# Patient Record
Sex: FEMALE | Race: WHITE
[De-identification: names, ages, dates, MRNs, and addresses within clinical notes are randomized per-mention and may not be internally consistent; named-entity substitution may affect disease eponyms.]

---

## 2023-04-07 VITALS
DIASTOLIC BLOOD PRESSURE: 89 MMHG | OXYGEN SATURATION: 100 % | RESPIRATION RATE: 16 BRPM | HEART RATE: 73 BPM | SYSTOLIC BLOOD PRESSURE: 136 MMHG | TEMPERATURE: 97.88 F

## 2023-04-13 ENCOUNTER — HOSPITAL ENCOUNTER (OUTPATIENT)
Dept: HOSPITAL 100 - SDC | Age: 53
Setting detail: OBSERVATION
LOS: 1 days | Discharge: HOME HEALTH | End: 2023-04-14
Payer: COMMERCIAL

## 2023-04-13 VITALS
DIASTOLIC BLOOD PRESSURE: 95 MMHG | RESPIRATION RATE: 16 BRPM | TEMPERATURE: 99.1 F | HEART RATE: 59 BPM | SYSTOLIC BLOOD PRESSURE: 148 MMHG | OXYGEN SATURATION: 98 %

## 2023-04-13 VITALS
HEART RATE: 68 BPM | OXYGEN SATURATION: 100 % | DIASTOLIC BLOOD PRESSURE: 95 MMHG | SYSTOLIC BLOOD PRESSURE: 148 MMHG | RESPIRATION RATE: 16 BRPM

## 2023-04-13 VITALS
OXYGEN SATURATION: 98 % | RESPIRATION RATE: 16 BRPM | HEART RATE: 60 BPM | DIASTOLIC BLOOD PRESSURE: 62 MMHG | SYSTOLIC BLOOD PRESSURE: 148 MMHG

## 2023-04-13 VITALS
RESPIRATION RATE: 16 BRPM | OXYGEN SATURATION: 100 % | DIASTOLIC BLOOD PRESSURE: 95 MMHG | SYSTOLIC BLOOD PRESSURE: 115 MMHG | HEART RATE: 73 BPM

## 2023-04-13 VITALS
DIASTOLIC BLOOD PRESSURE: 67 MMHG | RESPIRATION RATE: 16 BRPM | SYSTOLIC BLOOD PRESSURE: 115 MMHG | HEART RATE: 60 BPM | OXYGEN SATURATION: 100 %

## 2023-04-13 VITALS
RESPIRATION RATE: 16 BRPM | TEMPERATURE: 98.06 F | OXYGEN SATURATION: 96 % | DIASTOLIC BLOOD PRESSURE: 82 MMHG | HEART RATE: 62 BPM | SYSTOLIC BLOOD PRESSURE: 130 MMHG

## 2023-04-13 VITALS
SYSTOLIC BLOOD PRESSURE: 119 MMHG | DIASTOLIC BLOOD PRESSURE: 95 MMHG | RESPIRATION RATE: 16 BRPM | HEART RATE: 85 BPM | OXYGEN SATURATION: 96 %

## 2023-04-13 VITALS
RESPIRATION RATE: 18 BRPM | TEMPERATURE: 98.42 F | OXYGEN SATURATION: 96 % | SYSTOLIC BLOOD PRESSURE: 129 MMHG | HEART RATE: 74 BPM | DIASTOLIC BLOOD PRESSURE: 74 MMHG

## 2023-04-13 VITALS
TEMPERATURE: 98.96 F | OXYGEN SATURATION: 100 % | SYSTOLIC BLOOD PRESSURE: 148 MMHG | HEART RATE: 80 BPM | RESPIRATION RATE: 20 BRPM | DIASTOLIC BLOOD PRESSURE: 95 MMHG

## 2023-04-13 VITALS
DIASTOLIC BLOOD PRESSURE: 95 MMHG | HEART RATE: 96 BPM | TEMPERATURE: 98.78 F | SYSTOLIC BLOOD PRESSURE: 148 MMHG | OXYGEN SATURATION: 96 % | RESPIRATION RATE: 16 BRPM

## 2023-04-13 VITALS
HEART RATE: 64 BPM | DIASTOLIC BLOOD PRESSURE: 78 MMHG | RESPIRATION RATE: 18 BRPM | TEMPERATURE: 98.1 F | SYSTOLIC BLOOD PRESSURE: 122 MMHG | OXYGEN SATURATION: 96 %

## 2023-04-13 VITALS — BODY MASS INDEX: 21.3 KG/M2 | BODY MASS INDEX: 20.9 KG/M2

## 2023-04-13 DIAGNOSIS — L73.2: Primary | ICD-10-CM

## 2023-04-13 DIAGNOSIS — L30.9: ICD-10-CM

## 2023-04-13 PROCEDURE — 00400 ANES INTEGUMENTARY SYS NOS: CPT

## 2023-04-13 PROCEDURE — 96372 THER/PROPH/DIAG INJ SC/IM: CPT

## 2023-04-13 PROCEDURE — G0378 HOSPITAL OBSERVATION PER HR: HCPCS

## 2023-04-13 PROCEDURE — 87070 CULTURE OTHR SPECIMN AEROBIC: CPT

## 2023-04-13 PROCEDURE — 87102 FUNGUS ISOLATION CULTURE: CPT

## 2023-04-13 PROCEDURE — 94668 MNPJ CHEST WALL SBSQ: CPT

## 2023-04-13 PROCEDURE — 87186 SC STD MICRODIL/AGAR DIL: CPT

## 2023-04-13 PROCEDURE — 87206 SMEAR FLUORESCENT/ACID STAI: CPT

## 2023-04-13 PROCEDURE — 87176 TISSUE HOMOGENIZATION CULTR: CPT

## 2023-04-13 PROCEDURE — 80048 BASIC METABOLIC PNL TOTAL CA: CPT

## 2023-04-13 PROCEDURE — 87077 CULTURE AEROBIC IDENTIFY: CPT

## 2023-04-13 PROCEDURE — 87075 CULTR BACTERIA EXCEPT BLOOD: CPT

## 2023-04-13 PROCEDURE — 36415 COLL VENOUS BLD VENIPUNCTURE: CPT

## 2023-04-13 PROCEDURE — 96366 THER/PROPH/DIAG IV INF ADDON: CPT

## 2023-04-13 PROCEDURE — 96375 TX/PRO/DX INJ NEW DRUG ADDON: CPT

## 2023-04-13 PROCEDURE — 88304 TISSUE EXAM BY PATHOLOGIST: CPT

## 2023-04-13 PROCEDURE — 11451 EXC SKN HDRDNT AX COMPLEX: CPT

## 2023-04-13 PROCEDURE — 96365 THER/PROPH/DIAG IV INF INIT: CPT

## 2023-04-13 PROCEDURE — 84134 ASSAY OF PREALBUMIN: CPT

## 2023-04-13 PROCEDURE — 97165 OT EVAL LOW COMPLEX 30 MIN: CPT

## 2023-04-13 PROCEDURE — 85027 COMPLETE CBC AUTOMATED: CPT

## 2023-04-13 PROCEDURE — 99221 1ST HOSP IP/OBS SF/LOW 40: CPT

## 2023-04-13 PROCEDURE — 87205 SMEAR GRAM STAIN: CPT

## 2023-04-13 RX ADMIN — LIDOCAINE HYDROCHLORIDE AND EPINEPHRINE 20 ML: 10; 10 INJECTION, SOLUTION INFILTRATION; PERINEURAL at 13:23

## 2023-04-13 RX ADMIN — CEFAZOLIN 150 GM: 10 INJECTION, POWDER, FOR SOLUTION INTRAVENOUS at 12:48

## 2023-04-13 RX ADMIN — CEFAZOLIN SODIUM 100 GM: 1 INJECTION, SOLUTION INTRAVENOUS at 22:01

## 2023-04-14 VITALS
HEART RATE: 67 BPM | SYSTOLIC BLOOD PRESSURE: 125 MMHG | RESPIRATION RATE: 18 BRPM | TEMPERATURE: 98.4 F | OXYGEN SATURATION: 98 % | DIASTOLIC BLOOD PRESSURE: 70 MMHG

## 2023-04-14 VITALS
HEART RATE: 74 BPM | SYSTOLIC BLOOD PRESSURE: 121 MMHG | TEMPERATURE: 97.88 F | RESPIRATION RATE: 16 BRPM | DIASTOLIC BLOOD PRESSURE: 73 MMHG

## 2023-04-14 VITALS
SYSTOLIC BLOOD PRESSURE: 124 MMHG | OXYGEN SATURATION: 98 % | HEART RATE: 75 BPM | TEMPERATURE: 97.8 F | DIASTOLIC BLOOD PRESSURE: 85 MMHG | RESPIRATION RATE: 18 BRPM

## 2023-04-14 LAB
ANION GAP: 7 (ref 5–15)
BUN SERPL-MCNC: 9 MG/DL (ref 7–18)
BUN/CREAT RATIO: 12.9 RATIO (ref 10–20)
CALCIUM SERPL-MCNC: 9 MG/DL (ref 8.5–10.1)
CARBON DIOXIDE: 23 MMOL/L (ref 21–32)
CHLORIDE: 106 MMOL/L (ref 98–107)
DEPRECATED RDW RBC: 40 FL (ref 35.1–43.9)
ERYTHROCYTE [DISTWIDTH] IN BLOOD: 13.6 % (ref 11.6–14.6)
EST GLOM FILT RATE - AFR AMER: 114 ML/MIN (ref 60–?)
ESTIMATED CREATININE CLEARANCE: 85.56 ML/MIN
GLUCOSE: 134 MG/DL (ref 74–106)
HCT VFR BLD AUTO: 36.2 % (ref 37–47)
HEMOGLOBIN: 11.5 G/DL (ref 12–15)
HGB BLD-MCNC: 11.5 G/DL (ref 12–15)
MCV RBC: 81.2 FL (ref 81–99)
MEAN CORP HGB CONC: 31.8 G/DL (ref 32–36)
MEAN PLATELET VOL.: 9.8 FL (ref 6.2–12)
PLATELET # BLD: 265 K/MM3 (ref 150–450)
PLATELET COUNT: 265 K/MM3 (ref 150–450)
POTASSIUM: 3.9 MMOL/L (ref 3.5–5.1)
PREALB SERPL-MCNC: 26.8 MG/DL (ref 20–40)
RBC # BLD AUTO: 4.46 M/MM3 (ref 4.2–5.4)
RBC DISTRIBUTION WIDTH CV: 13.6 % (ref 11.6–14.6)
RBC DISTRIBUTION WIDTH SD: 40 FL (ref 35.1–43.9)
WBC # BLD AUTO: 7.7 K/MM3 (ref 4.4–11)
WHITE BLOOD COUNT: 7.7 K/MM3 (ref 4.4–11)

## 2023-04-14 RX ADMIN — CEFAZOLIN SODIUM 100 GM: 1 INJECTION, SOLUTION INTRAVENOUS at 13:59

## 2023-04-14 RX ADMIN — CEFAZOLIN SODIUM 100 GM: 1 INJECTION, SOLUTION INTRAVENOUS at 05:35

## 2023-04-14 RX ADMIN — Medication 1 PACKET: at 08:03

## 2023-04-19 VITALS
RESPIRATION RATE: 16 BRPM | SYSTOLIC BLOOD PRESSURE: 132 MMHG | TEMPERATURE: 97.2 F | HEART RATE: 89 BPM | DIASTOLIC BLOOD PRESSURE: 82 MMHG

## 2023-04-26 ENCOUNTER — HOSPITAL ENCOUNTER (OUTPATIENT)
Dept: HOSPITAL 100 - WC | Age: 53
LOS: 4 days | Discharge: HOME | End: 2023-04-30
Payer: COMMERCIAL

## 2023-04-26 VITALS — BODY MASS INDEX: 21.1 KG/M2

## 2023-04-26 VITALS
DIASTOLIC BLOOD PRESSURE: 89 MMHG | TEMPERATURE: 96.98 F | RESPIRATION RATE: 20 BRPM | HEART RATE: 71 BPM | SYSTOLIC BLOOD PRESSURE: 135 MMHG

## 2023-04-26 DIAGNOSIS — F41.9: ICD-10-CM

## 2023-04-26 DIAGNOSIS — L30.9: ICD-10-CM

## 2023-04-26 DIAGNOSIS — L73.2: ICD-10-CM

## 2023-04-26 DIAGNOSIS — G89.18: ICD-10-CM

## 2023-04-26 DIAGNOSIS — S41.101A: Primary | ICD-10-CM

## 2023-04-26 DIAGNOSIS — Z79.899: ICD-10-CM

## 2023-04-26 PROCEDURE — 99214 OFFICE O/P EST MOD 30 MIN: CPT

## 2023-04-26 PROCEDURE — 99213 OFFICE O/P EST LOW 20 MIN: CPT

## 2023-04-26 PROCEDURE — 97606 NEG PRS WND THER DME>50 SQCM: CPT

## 2023-04-26 PROCEDURE — G0463 HOSPITAL OUTPT CLINIC VISIT: HCPCS

## 2023-05-01 VITALS
TEMPERATURE: 97 F | RESPIRATION RATE: 20 BRPM | SYSTOLIC BLOOD PRESSURE: 135 MMHG | HEART RATE: 71 BPM | DIASTOLIC BLOOD PRESSURE: 89 MMHG

## 2023-05-03 VITALS
SYSTOLIC BLOOD PRESSURE: 139 MMHG | TEMPERATURE: 99 F | HEART RATE: 76 BPM | RESPIRATION RATE: 20 BRPM | DIASTOLIC BLOOD PRESSURE: 82 MMHG

## 2023-05-10 VITALS
RESPIRATION RATE: 20 BRPM | HEART RATE: 72 BPM | TEMPERATURE: 97.5 F | SYSTOLIC BLOOD PRESSURE: 123 MMHG | DIASTOLIC BLOOD PRESSURE: 76 MMHG

## 2023-05-17 VITALS — HEART RATE: 76 BPM | DIASTOLIC BLOOD PRESSURE: 69 MMHG | SYSTOLIC BLOOD PRESSURE: 139 MMHG | TEMPERATURE: 96.4 F

## 2023-05-24 VITALS — HEART RATE: 44 BPM | TEMPERATURE: 97.1 F | DIASTOLIC BLOOD PRESSURE: 65 MMHG | SYSTOLIC BLOOD PRESSURE: 117 MMHG

## 2023-05-31 ENCOUNTER — HOSPITAL ENCOUNTER (OUTPATIENT)
Dept: HOSPITAL 100 - WC | Age: 53
Discharge: HOME | End: 2023-05-31
Payer: COMMERCIAL

## 2023-05-31 VITALS — HEART RATE: 78 BPM | SYSTOLIC BLOOD PRESSURE: 135 MMHG | TEMPERATURE: 98.3 F | DIASTOLIC BLOOD PRESSURE: 75 MMHG

## 2023-05-31 DIAGNOSIS — L30.9: ICD-10-CM

## 2023-05-31 DIAGNOSIS — L73.2: ICD-10-CM

## 2023-05-31 DIAGNOSIS — S41.101A: Primary | ICD-10-CM

## 2023-05-31 DIAGNOSIS — Z79.899: ICD-10-CM

## 2023-05-31 DIAGNOSIS — G89.18: ICD-10-CM

## 2023-05-31 PROCEDURE — 11046 DBRDMT MUSC&/FSCA EA ADDL: CPT

## 2023-05-31 PROCEDURE — 11042 DBRDMT SUBQ TIS 1ST 20SQCM/<: CPT

## 2023-05-31 PROCEDURE — 11043 DBRDMT MUSC&/FSCA 1ST 20/<: CPT

## 2023-06-01 VITALS
SYSTOLIC BLOOD PRESSURE: 135 MMHG | HEART RATE: 78 BPM | RESPIRATION RATE: 20 BRPM | TEMPERATURE: 98.3 F | DIASTOLIC BLOOD PRESSURE: 75 MMHG

## 2023-06-07 VITALS — TEMPERATURE: 97.34 F | DIASTOLIC BLOOD PRESSURE: 79 MMHG | HEART RATE: 68 BPM | SYSTOLIC BLOOD PRESSURE: 131 MMHG

## 2023-06-14 VITALS — HEART RATE: 95 BPM | SYSTOLIC BLOOD PRESSURE: 127 MMHG | TEMPERATURE: 96.44 F | DIASTOLIC BLOOD PRESSURE: 78 MMHG

## 2023-06-21 VITALS — DIASTOLIC BLOOD PRESSURE: 62 MMHG | HEART RATE: 83 BPM | TEMPERATURE: 97.16 F | SYSTOLIC BLOOD PRESSURE: 127 MMHG

## 2023-06-28 ENCOUNTER — HOSPITAL ENCOUNTER (OUTPATIENT)
Dept: HOSPITAL 100 - WC | Age: 53
LOS: 2 days | Discharge: HOME | End: 2023-06-30
Payer: COMMERCIAL

## 2023-06-28 VITALS
RESPIRATION RATE: 18 BRPM | DIASTOLIC BLOOD PRESSURE: 87 MMHG | HEART RATE: 76 BPM | TEMPERATURE: 97.3 F | SYSTOLIC BLOOD PRESSURE: 135 MMHG

## 2023-06-28 VITALS — BODY MASS INDEX: 21.1 KG/M2

## 2023-06-28 DIAGNOSIS — Z79.899: ICD-10-CM

## 2023-06-28 DIAGNOSIS — L73.2: ICD-10-CM

## 2023-06-28 DIAGNOSIS — L30.9: ICD-10-CM

## 2023-06-28 DIAGNOSIS — M25.611: ICD-10-CM

## 2023-06-28 DIAGNOSIS — L98.492: Primary | ICD-10-CM

## 2023-06-28 PROCEDURE — 11042 DBRDMT SUBQ TIS 1ST 20SQCM/<: CPT

## 2023-07-01 VITALS
TEMPERATURE: 97.3 F | RESPIRATION RATE: 18 BRPM | SYSTOLIC BLOOD PRESSURE: 135 MMHG | DIASTOLIC BLOOD PRESSURE: 87 MMHG | HEART RATE: 76 BPM

## 2023-07-05 VITALS
DIASTOLIC BLOOD PRESSURE: 75 MMHG | SYSTOLIC BLOOD PRESSURE: 132 MMHG | TEMPERATURE: 97.88 F | RESPIRATION RATE: 18 BRPM | HEART RATE: 78 BPM

## 2023-07-12 VITALS
SYSTOLIC BLOOD PRESSURE: 136 MMHG | HEART RATE: 73 BPM | TEMPERATURE: 97.6 F | DIASTOLIC BLOOD PRESSURE: 83 MMHG | RESPIRATION RATE: 18 BRPM

## 2023-07-26 ENCOUNTER — HOSPITAL ENCOUNTER (OUTPATIENT)
Dept: HOSPITAL 100 - WC | Age: 53
LOS: 5 days | Discharge: HOME | End: 2023-07-31
Payer: COMMERCIAL

## 2023-07-26 VITALS
TEMPERATURE: 96.98 F | RESPIRATION RATE: 16 BRPM | DIASTOLIC BLOOD PRESSURE: 75 MMHG | SYSTOLIC BLOOD PRESSURE: 141 MMHG | HEART RATE: 66 BPM

## 2023-07-26 VITALS — BODY MASS INDEX: 21.1 KG/M2

## 2023-07-26 DIAGNOSIS — L73.2: ICD-10-CM

## 2023-07-26 DIAGNOSIS — M25.611: ICD-10-CM

## 2023-07-26 DIAGNOSIS — F41.9: ICD-10-CM

## 2023-07-26 DIAGNOSIS — Z79.899: ICD-10-CM

## 2023-07-26 DIAGNOSIS — L30.9: ICD-10-CM

## 2023-07-26 DIAGNOSIS — L98.492: Primary | ICD-10-CM

## 2023-07-26 PROCEDURE — 11042 DBRDMT SUBQ TIS 1ST 20SQCM/<: CPT

## 2023-08-01 VITALS
DIASTOLIC BLOOD PRESSURE: 75 MMHG | HEART RATE: 66 BPM | SYSTOLIC BLOOD PRESSURE: 141 MMHG | TEMPERATURE: 97 F | RESPIRATION RATE: 16 BRPM

## 2023-08-02 VITALS — RESPIRATION RATE: 16 BRPM | DIASTOLIC BLOOD PRESSURE: 66 MMHG | HEART RATE: 67 BPM | SYSTOLIC BLOOD PRESSURE: 152 MMHG

## 2023-08-09 VITALS
HEART RATE: 74 BPM | RESPIRATION RATE: 16 BRPM | SYSTOLIC BLOOD PRESSURE: 129 MMHG | TEMPERATURE: 97.3 F | DIASTOLIC BLOOD PRESSURE: 82 MMHG

## 2023-08-16 VITALS
RESPIRATION RATE: 18 BRPM | SYSTOLIC BLOOD PRESSURE: 128 MMHG | DIASTOLIC BLOOD PRESSURE: 107 MMHG | TEMPERATURE: 98.42 F | HEART RATE: 80 BPM

## 2023-08-23 VITALS
HEART RATE: 71 BPM | SYSTOLIC BLOOD PRESSURE: 152 MMHG | DIASTOLIC BLOOD PRESSURE: 84 MMHG | RESPIRATION RATE: 18 BRPM | TEMPERATURE: 97.52 F

## 2023-08-30 ENCOUNTER — HOSPITAL ENCOUNTER (OUTPATIENT)
Dept: HOSPITAL 100 - WC | Age: 53
LOS: 1 days | Discharge: HOME | End: 2023-08-31
Payer: COMMERCIAL

## 2023-08-30 VITALS
SYSTOLIC BLOOD PRESSURE: 131 MMHG | TEMPERATURE: 97.3 F | DIASTOLIC BLOOD PRESSURE: 74 MMHG | RESPIRATION RATE: 20 BRPM | HEART RATE: 76 BPM

## 2023-08-30 VITALS — BODY MASS INDEX: 21.1 KG/M2

## 2023-08-30 DIAGNOSIS — L30.9: ICD-10-CM

## 2023-08-30 DIAGNOSIS — Z79.899: ICD-10-CM

## 2023-08-30 DIAGNOSIS — L73.2: ICD-10-CM

## 2023-08-30 DIAGNOSIS — L98.492: Primary | ICD-10-CM

## 2023-08-30 DIAGNOSIS — M25.611: ICD-10-CM

## 2023-08-30 PROCEDURE — 87077 CULTURE AEROBIC IDENTIFY: CPT

## 2023-08-30 PROCEDURE — 87070 CULTURE OTHR SPECIMN AEROBIC: CPT

## 2023-08-30 PROCEDURE — 87075 CULTR BACTERIA EXCEPT BLOOD: CPT

## 2023-08-30 PROCEDURE — 87205 SMEAR GRAM STAIN: CPT

## 2023-08-30 PROCEDURE — 11042 DBRDMT SUBQ TIS 1ST 20SQCM/<: CPT

## 2023-08-30 PROCEDURE — 87186 SC STD MICRODIL/AGAR DIL: CPT

## 2023-09-01 VITALS
TEMPERATURE: 97.34 F | RESPIRATION RATE: 20 BRPM | HEART RATE: 76 BPM | DIASTOLIC BLOOD PRESSURE: 74 MMHG | SYSTOLIC BLOOD PRESSURE: 131 MMHG

## 2023-09-06 ENCOUNTER — HOSPITAL ENCOUNTER (OUTPATIENT)
Dept: HOSPITAL 100 - WC | Age: 53
LOS: 1 days | Discharge: HOME | End: 2023-09-07
Payer: COMMERCIAL

## 2023-09-06 VITALS
TEMPERATURE: 97.88 F | RESPIRATION RATE: 18 BRPM | HEART RATE: 67 BPM | SYSTOLIC BLOOD PRESSURE: 139 MMHG | DIASTOLIC BLOOD PRESSURE: 71 MMHG

## 2023-09-06 VITALS — BODY MASS INDEX: 21.1 KG/M2

## 2023-09-06 DIAGNOSIS — Z79.899: ICD-10-CM

## 2023-09-06 DIAGNOSIS — M25.611: ICD-10-CM

## 2023-09-06 DIAGNOSIS — Z09: Primary | ICD-10-CM

## 2023-09-06 DIAGNOSIS — L90.5: ICD-10-CM

## 2023-09-06 DIAGNOSIS — L30.9: ICD-10-CM

## 2023-09-06 PROCEDURE — G0463 HOSPITAL OUTPT CLINIC VISIT: HCPCS

## 2023-09-06 PROCEDURE — 99213 OFFICE O/P EST LOW 20 MIN: CPT

## 2023-09-18 ENCOUNTER — HOSPITAL ENCOUNTER (OUTPATIENT)
Dept: HOSPITAL 100 - WC | Age: 53
LOS: 12 days | Discharge: HOME | End: 2023-09-30
Payer: COMMERCIAL

## 2023-09-18 VITALS
RESPIRATION RATE: 16 BRPM | DIASTOLIC BLOOD PRESSURE: 75 MMHG | SYSTOLIC BLOOD PRESSURE: 134 MMHG | TEMPERATURE: 97.34 F | HEART RATE: 75 BPM

## 2023-09-18 DIAGNOSIS — Z79.899: ICD-10-CM

## 2023-09-18 DIAGNOSIS — L73.2: ICD-10-CM

## 2023-09-18 DIAGNOSIS — L30.9: ICD-10-CM

## 2023-09-18 DIAGNOSIS — L98.492: Primary | ICD-10-CM

## 2023-09-18 DIAGNOSIS — M25.611: ICD-10-CM

## 2023-09-18 PROCEDURE — 11042 DBRDMT SUBQ TIS 1ST 20SQCM/<: CPT

## 2023-10-01 VITALS
HEART RATE: 75 BPM | RESPIRATION RATE: 16 BRPM | SYSTOLIC BLOOD PRESSURE: 134 MMHG | TEMPERATURE: 97.3 F | DIASTOLIC BLOOD PRESSURE: 75 MMHG

## 2023-10-04 VITALS
HEART RATE: 74 BPM | DIASTOLIC BLOOD PRESSURE: 94 MMHG | RESPIRATION RATE: 18 BRPM | SYSTOLIC BLOOD PRESSURE: 131 MMHG | TEMPERATURE: 97.7 F

## 2023-10-09 VITALS — DIASTOLIC BLOOD PRESSURE: 73 MMHG | TEMPERATURE: 97 F | SYSTOLIC BLOOD PRESSURE: 116 MMHG | HEART RATE: 67 BPM

## 2023-10-11 VITALS
DIASTOLIC BLOOD PRESSURE: 90 MMHG | SYSTOLIC BLOOD PRESSURE: 122 MMHG | TEMPERATURE: 97.34 F | RESPIRATION RATE: 18 BRPM | HEART RATE: 68 BPM

## 2023-10-18 VITALS
TEMPERATURE: 97.1 F | RESPIRATION RATE: 18 BRPM | DIASTOLIC BLOOD PRESSURE: 88 MMHG | HEART RATE: 73 BPM | SYSTOLIC BLOOD PRESSURE: 130 MMHG

## 2023-10-25 ENCOUNTER — HOSPITAL ENCOUNTER (OUTPATIENT)
Dept: HOSPITAL 100 - WC | Age: 53
LOS: 6 days | Discharge: HOME | End: 2023-10-31
Payer: COMMERCIAL

## 2023-10-25 VITALS — DIASTOLIC BLOOD PRESSURE: 80 MMHG | SYSTOLIC BLOOD PRESSURE: 131 MMHG | HEART RATE: 69 BPM | TEMPERATURE: 96.62 F

## 2023-10-25 DIAGNOSIS — M25.611: ICD-10-CM

## 2023-10-25 DIAGNOSIS — L30.9: ICD-10-CM

## 2023-10-25 DIAGNOSIS — L73.2: ICD-10-CM

## 2023-10-25 DIAGNOSIS — L98.492: Primary | ICD-10-CM

## 2023-10-25 DIAGNOSIS — Z79.899: ICD-10-CM

## 2023-10-25 DIAGNOSIS — F41.9: ICD-10-CM

## 2023-10-25 PROCEDURE — 11042 DBRDMT SUBQ TIS 1ST 20SQCM/<: CPT

## 2023-10-25 PROCEDURE — G0463 HOSPITAL OUTPT CLINIC VISIT: HCPCS

## 2023-10-25 PROCEDURE — 99211 OFF/OP EST MAY X REQ PHY/QHP: CPT

## 2023-11-01 VITALS
SYSTOLIC BLOOD PRESSURE: 131 MMHG | TEMPERATURE: 96.62 F | DIASTOLIC BLOOD PRESSURE: 80 MMHG | HEART RATE: 69 BPM | RESPIRATION RATE: 18 BRPM

## 2023-11-08 VITALS — SYSTOLIC BLOOD PRESSURE: 133 MMHG | DIASTOLIC BLOOD PRESSURE: 67 MMHG | TEMPERATURE: 97.34 F | HEART RATE: 65 BPM

## 2023-11-15 VITALS
DIASTOLIC BLOOD PRESSURE: 85 MMHG | HEART RATE: 85 BPM | TEMPERATURE: 96.1 F | RESPIRATION RATE: 16 BRPM | SYSTOLIC BLOOD PRESSURE: 148 MMHG

## 2023-11-20 VITALS
HEART RATE: 70 BPM | DIASTOLIC BLOOD PRESSURE: 73 MMHG | TEMPERATURE: 97.34 F | RESPIRATION RATE: 18 BRPM | SYSTOLIC BLOOD PRESSURE: 135 MMHG

## 2023-11-27 ENCOUNTER — HOSPITAL ENCOUNTER (OUTPATIENT)
Dept: HOSPITAL 100 - WC | Age: 53
LOS: 3 days | Discharge: HOME | End: 2023-11-30
Payer: COMMERCIAL

## 2023-11-27 ENCOUNTER — APPOINTMENT (OUTPATIENT)
Dept: DERMATOLOGY | Facility: CLINIC | Age: 53
End: 2023-11-27
Payer: COMMERCIAL

## 2023-11-27 VITALS
SYSTOLIC BLOOD PRESSURE: 127 MMHG | RESPIRATION RATE: 18 BRPM | HEART RATE: 68 BPM | TEMPERATURE: 97.52 F | DIASTOLIC BLOOD PRESSURE: 78 MMHG

## 2023-11-27 DIAGNOSIS — L73.2: ICD-10-CM

## 2023-11-27 DIAGNOSIS — L98.492: Primary | ICD-10-CM

## 2023-11-27 DIAGNOSIS — Z79.899: ICD-10-CM

## 2023-11-27 DIAGNOSIS — M25.611: ICD-10-CM

## 2023-11-27 DIAGNOSIS — L30.9: ICD-10-CM

## 2023-11-27 PROCEDURE — G0463 HOSPITAL OUTPT CLINIC VISIT: HCPCS

## 2023-11-27 PROCEDURE — 11042 DBRDMT SUBQ TIS 1ST 20SQCM/<: CPT

## 2023-11-27 PROCEDURE — 99211 OFF/OP EST MAY X REQ PHY/QHP: CPT

## 2023-12-01 VITALS
DIASTOLIC BLOOD PRESSURE: 78 MMHG | HEART RATE: 68 BPM | SYSTOLIC BLOOD PRESSURE: 127 MMHG | TEMPERATURE: 97.5 F | RESPIRATION RATE: 18 BRPM

## 2023-12-04 VITALS
DIASTOLIC BLOOD PRESSURE: 63 MMHG | HEART RATE: 69 BPM | SYSTOLIC BLOOD PRESSURE: 125 MMHG | TEMPERATURE: 96.9 F | RESPIRATION RATE: 16 BRPM

## 2023-12-11 ENCOUNTER — HOSPITAL ENCOUNTER (OUTPATIENT)
Dept: HOSPITAL 100 - WC | Age: 53
LOS: 1 days | Discharge: HOME | End: 2023-12-12
Payer: COMMERCIAL

## 2023-12-11 VITALS
DIASTOLIC BLOOD PRESSURE: 89 MMHG | SYSTOLIC BLOOD PRESSURE: 129 MMHG | RESPIRATION RATE: 16 BRPM | TEMPERATURE: 96.62 F | HEART RATE: 90 BPM

## 2023-12-11 DIAGNOSIS — Z09: Primary | ICD-10-CM

## 2023-12-11 DIAGNOSIS — M25.611: ICD-10-CM

## 2023-12-11 DIAGNOSIS — L30.9: ICD-10-CM

## 2023-12-11 DIAGNOSIS — Z79.899: ICD-10-CM

## 2023-12-11 PROCEDURE — 99211 OFF/OP EST MAY X REQ PHY/QHP: CPT

## 2023-12-11 PROCEDURE — G0463 HOSPITAL OUTPT CLINIC VISIT: HCPCS

## 2023-12-11 PROCEDURE — 99213 OFFICE O/P EST LOW 20 MIN: CPT

## 2023-12-11 NOTE — PCM.WC.PN
History of Present Illness
Date of Service: 23
Chief Complaint: right axilla Hidradenitis excision
History of Wound: 53 year old woman presents with increasing pain and redness and swelling right axilla that started a couple months ago.  She has had I&D procedures by Dr. Ash.  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 
be allergic to the chemicals used for cleaning.  She has a history of eczema and had been on Dupixent in the past.  She thinks the eczema has worsened recently since the development of her right axillary hidradenitis.  She saw a Dermatologist for 
the eczema.   
Surgery 23 - Surgical preparation right axilla with excision hidradenitis (60 cm2). 
She was discharged on 23 with home health to assist with the wound VAC dressing changes 3 times per week.  
 
Operative cultures positive for Staphylococcus epidermidis.   
Wound culture obtained on 23 which was positive for MSSA.  She was initially started on Augmentin but she claimed she was allergic to it, so it was changed to Doxycycline. 
 
Wound care is Aquacel-Ag slightly moistened covered with gauze. 
 
At present, she denies fever, chills, nausea, vomiting.
Progress of Wound: 
Right axilla ulcer is healed today!

Objective Data
Objective Data
Vital Signs: 
Vital Signs

Temp Pulse Resp BP O2 Del Method
 96.6 F L  90   16   129/89 H  Room Air 
 23 08:21  23 08:21  23 08:21  23 08:21  23 08:21



Oxygen Delivery Method         Room Air                                         



Charges/Coding
Visit Charges
Office Visits / Consults: 27266 OV L3 Est

Physical Exam
Const
alert, oriented x3 and no apparent distress
HEENT
normocephalic
Eyes
General Eye: normal appearance of both eyes
Resp
normal respiratory effort
Effort and Inspection: able to speak in complete sentences
Cardio
regular rate and regular rhythm
Extremity
normal capillary refill
Extremity Narrative: 
Right shoulder still does not have full range of motion.
Skin
Wound Narrative: 
Right axilla ulcer is healed.  The healed scarring is dry.
Neuro
CN's II-XII intact bilaterally
Psych
cooperative
Psych Narrative: 
She is anxious but that is her normal baseline.
Appearance: appropriate and well kempt

Debridement Note
Debridement Note
No debridement was completed: No debridement was completed today
Post-Debridement Measurements and Additional Note: 
Post-Debridement Measurements/Treatment

 - Nurse 1 - General Ulcer Assessment               Start:  23 08:19
Freq:                                                 Status: Active        
Protocol:  ODIN                                                        
Activity Type Activity Date Activity User E-sign Co-sign Detail
Recorded Client Recorded Date Recorded By   
Document 23 08:19 [facility]   
Desktop 23 08:20 [facility]   
Document 23 08:21 [facility]   
Desktop 23 08:23 [facility]   

  23
  08:19 08:21
 - Today's Visit Information  
Type of service Nurse-only Follow-up Visit
 Visit (Physician/CNP
  )
Arrival Mode Ambulatory Ambulatory
Transfer Assistance None None
Patient Identification Verified (Name & Yes Yes
)  
Patient Requires Transmission-Based No No
Precautions  
Vital Signs  
Temperature (97.8 F-99.1 F) 96.9 F L 96.6 F L
Temperature Source Temporal Temporal
Pulse Rate () 69 90
Pulse Location Monitor Monitor
Respiratory Rate (12-18) 16 16
Respiratory rate source Observation Observation
Oxygen Delivery Method Room Air Room Air
Blood Pressure (90//80) 125/63 H 129/89 H
Blood Pressure Mean (mm Hg) 83 102
Source Monitor Monitor
Position Sitting 
Blood Pressure Location Left Arm Left Arm
History Since Last Visit- (Skip if this  
is Patient's initial visit)  
Have you changed medications since your No No
last visit?  
Any new allergies or adverse reactions No No
Had a fall/change in ADL's that may No No
increase risk of falls  
Signs or symptoms of abuse and/or No No
neglect since last visit  
Have you been in the hospital since your No No
last visit?  
Has dressing in place as prescribed Yes Yes
Has compression in place as prescribed N/A N/A
Has offloadiing in place as prescribed N/A N/A
Experienced any changes in pain level or No No
management  
Left Footwear Regular Shoe Regular Shoe
Right Footwear Regular Shoe Regular Shoe
Pain Scale: 0-10 Numeric  
Is Patient Pain Free? Yes Yes

[facility] Nurse 1 - General Ulcer Measurement              Start:  23 08:19
Freq:                                                 Status: Active        
Protocol:                                                                   
Activity Type Activity Date Activity User E-sign Co-sign Detail
Recorded Client Recorded Date Recorded By   
Document 23 08:21 [facility]   
Desktop 23 08:23 [facility]   

  23
  08:21
Wound Center Nurse 1 
#1 R Axilla cluster 
 -Combined with other wound No
 -Current Size (cm) - Length 0.1
 -Current Size (cm) - Width 0.1
 -Current Size (cm) - Depth 0.1
 -Total Square Cm 0.01
 -Date of Last Picture (Recall this 23
field) 
 -Photo Taken Yes
 -Epithelialization Large %
 -Tunneling No
 -Undermining/Tunneling No
 -Circular Undermining No
 -Exudate Amt None Present
 -Texture (Lilian-wound Skin Appearance) Assessed,
 Scarring
 -Moisture (Lilian-wound Skin Appearance) Assessed,Dry/
 Scaly
 -Color (Lilian-wound Skin Appearance) Assessed
 -Temperature (Lilian-wound Skin No Abnormality
Appearance) (Pt Warm)
 -Tenderness on Palpation (Lilian-wound No
Skin Appearance) 
 -Ulcer Cleansing Rinsed/
 Irrigated with
 Saline
 -Foul Odor after Cleansing No
 -Anesthetic Used 5% Lidocaine
 Gel

 - Nurse 2 - General Ulcer CM Notes                 Start:  23 08:19
Freq:                                                 Status: Active        
Protocol:                                                                   
Activity Type Activity Date Activity User E-sign Co-sign Detail
Recorded Client Recorded Date Recorded By   
Document 23 09:03    
Laptop 23 09:03    

  23
  09:03
Wound Center Nurse 2 
 -Correct Patient No
 -Correct Side, Site, Position No
 -Correct Procedure No
 -Procedure Performed No
 -Post Debridement (cm) - Length 0
 -Post Debridement (cm) - Width 0
 -Post Debridement (cm) - Depth 0
 -Total Square (Post) (cm) 0
 -Area of Debridement (cm) - Length 0
 -Area of Debridement (cm) - Width 0
 -Total Square (Area) (cm) 0
 -Wound/Ulcer Outcome Healed-
 Epithelialized
Pain Scale: 0-10 Numeric 
Is Patient Pain Free? Yes

 - Nurse 3 - General Ulcer D/C NN                   Start:  23 08:19
Freq:                                                 Status: Active        
Protocol:                                                                   
Activity Type Activity Date Activity User E-sign Co-sign Detail
Recorded Client Recorded Date Recorded By   
Document 23 08:19 [facility]   
Desktop 23 08:20 [facility]   
Document 23 09:04    
Laptop 23 09:04    

  23
  08:19 09:04
Vital Signs  
Temperature (97.8 F-99.1 F) 96.9 F L 
Temperature Source Temporal 
Pulse Rate () 69 
Pulse Location Monitor 
Respiratory Rate (12-18) 16 
Respiratory rate source Observation 
Oxygen Delivery Method Room Air 
Blood Pressure (90//80) 125/63 H 
Blood Pressure Mean (mm Hg) 83 
Source Monitor 
Position Sitting 
Blood Pressure Location Left Arm 
Pain Scale: 0-10 Numeric  
Is Patient Pain Free? Yes Yes
Wound Care Center Nurse 3  
#1 R Axilla cluster  
 -Ulcer Cleansing Rinsed/ 
 Irrigated with 
 Saline 
 -Foul Odor after Cleansing No 
 -Primary Dressing Applied Promogran 
 Randa Matter 
 -Other Dressing secured w/ 
 bandaid. ok'd 
 per cm 
 -Primary Dressing Covered/Secured with Dry Gauze 
 -Promogran Randa Matter 1 
Treatment Response Procedure 
 Tolerated Well 
WC - Visit Discharge  
Discharge Condition Stable Stable
Ambulatory Status Ambulatory Ambulatory
Transportation Private Auto Private Auto
Medication Reconcilliation completed &  Yes
provided to patient/care provider  
Clinical Summary of Care Provided  Yes




Assessment/Plan
Assessment/Plan
(1) Skin ulcer of axilla with fat layer exposed: 
CODE(S):       L98.492 - Non-pressure chronic ulcer of skin of other sites with fat layer exposed
(2) Right axillary hidradenitis: 
CODE(S):       L73.2 - Hidradenitis suppurativa
(3) Stiffness of right shoulder joint: 
CODE(S):       M25.611 - Stiffness of right shoulder, not elsewhere classified
PLAN: 
Plan
Patient evaluated at the wound healing center today.
She is healed today.
Instructed her to place lotion a couple times a day onto the healed ulcer and massage to help soften the scarring.
She continues to have decreased range of motion of her right shoulder.  Encouraged her to continue her PT but also to do her home excerises to help get as much ROM of her shoulder as possible. 
She has yet to make an appointment to see a counselor to help her with her anxiety.
She has seen her PCP and followed up with her.


A wound culture was obtained on 23 and was positive for MSSA.  She was initially started on Augmentin but then she said she was allergic to it (unsure of what her allergy is), her antibiotic was changed to Doxycycline, which she completed.
Operative culture shows JOHN. She completed the Linezolid.

She has returned to work.  She states that she is able to do her job.

Follow up as needed.

## 2023-12-11 NOTE — PN.PCM_ITS
History of Present Illness    
Date of Service: 23    
Chief Complaint: right axilla Hidradenitis excision    
History of Wound: 53 year old woman presents with increasing pain and redness   
and swelling right axilla that started a couple months ago.  She has had I&D   
procedures by Dr. Ash.  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  She has a   
history of eczema and had been on Dupixent in the past.  She thinks the eczema   
has worsened recently since the development of her right axillary hidradenitis.   
She saw a Dermatologist for the eczema.       
Surgery 23 - Surgical preparation right axilla with excision hidradenitis   
(60 cm2).     
She was discharged on 23 with home health to assist with the wound VAC   
dressing changes 3 times per week.      
     
Operative cultures positive for Staphylococcus epidermidis.       
Wound culture obtained on 23 which was positive for MSSA.  She was   
initially started on Augmentin but she claimed she was allergic to it, so it was  
changed to Doxycycline.     
     
Wound care is Aquacel-Ag slightly moistened covered with gauze.     
     
At present, she denies fever, chills, nausea, vomiting.    
Progress of Wound:     
Right axilla ulcer is healed today!    
    
Objective Data    
Objective Data    
Vital Signs:     
Vital Signs    
    
    
    
Temp Pulse Resp BP O2 Del Method    
     
 96.6 F L  90   16   129/89 H  Room Air     
     
 23 08:21  23 08:21  23 08:21  23 08:21  23 08:21    
    
    
    
    
Oxygen Delivery Method           Room Air                                         
       
    
    
    
Charges/Coding    
Visit Charges    
Office Visits / Consults: 18207 OV L3 Est    
    
Physical Exam    
Const    
alert, oriented x3 and no apparent distress    
HEENT    
normocephalic    
Eyes    
General Eye: normal appearance of both eyes    
Resp    
normal respiratory effort    
Effort and Inspection: able to speak in complete sentences    
Cardio    
regular rate and regular rhythm    
Extremity    
normal capillary refill    
Extremity Narrative:     
Right shoulder still does not have full range of motion.    
Skin    
Wound Narrative:     
Right axilla ulcer is healed.  The healed scarring is dry.    
Neuro    
CN's II-XII intact bilaterally    
Psych    
cooperative    
Psych Narrative:     
She is anxious but that is her normal baseline.    
Appearance: appropriate and well kempt    
    
Debridement Note    
Debridement Note    
No debridement was completed: No debridement was completed today    
Post-Debridement Measurements and Additional Note:     
Post-Debridement Measurements/Treatment    
    
 - Nurse 1 - General Ulcer Assessment               Start:  23 08:19    
Freq:                                                 Status: Active            
Protocol:  ODIN                                                            
    
    
Activity Type Activity Date Activity User E-sign Co-sign Detail    
    
Recorded Client Recorded Date Recorded By      
     
Document 23 08:19 [facility]       
    
Desktop 23 08:20 [facility]       
     
Document 23 08:21 [facility]       
    
Desktop 23 08:23 [facility]       
    
    
    
    
  23    
    
  08:19 08:21    
     
 - Today's Visit Information      
     
Type of service Nurse-only Follow-up Visit    
    
 Visit (Physician/CNP    
    
  )    
     
Arrival Mode Ambulatory Ambulatory    
     
Transfer Assistance None None    
     
Patient Identification Verified (Name & Yes Yes    
    
)      
     
Patient Requires Transmission-Based No No    
    
Precautions      
     
Vital Signs      
     
Temperature (97.8 F-99.1 F) 96.9 F L 96.6 F L    
     
Temperature Source Temporal Temporal    
     
Pulse Rate () 69 90    
     
Pulse Location Monitor Monitor    
     
Respiratory Rate (12-18) 16 16    
     
Respiratory rate source Observation Observation    
     
Oxygen Delivery Method Room Air Room Air    
     
Blood Pressure (90//80) 125/63 H 129/89 H    
     
Blood Pressure Mean (mm Hg) 83 102    
     
Source Monitor Monitor    
     
Position Sitting     
     
Blood Pressure Location Left Arm Left Arm    
     
History Since Last Visit- (Skip if this      
    
is Patient's initial visit)      
     
Have you changed medications since your No No    
    
last visit?      
     
Any new allergies or adverse reactions No No    
     
Had a fall/change in ADL's that may No No    
    
increase risk of falls      
     
Signs or symptoms of abuse and/or No No    
    
neglect since last visit      
     
Have you been in the hospital since your No No    
    
last visit?      
     
Has dressing in place as prescribed Yes Yes    
     
Has compression in place as prescribed N/A N/A    
     
Has offloadiing in place as prescribed N/A N/A    
     
Experienced any changes in pain level or No No    
    
management      
     
Left Footwear Regular Shoe Regular Shoe    
     
Right Footwear Regular Shoe Regular Shoe    
     
Pain Scale: 0-10 Numeric      
     
Is Patient Pain Free? Yes Yes    
    
    
[facility] Nurse 1 - General Ulcer Measurement              Start:  23 08:19    
Freq:                                                 Status: Active            
Protocol:                                                                       
    
    
Activity Type Activity Date Activity User E-sign Co-sign Detail    
    
Recorded Client Recorded Date Recorded By      
     
Document 23 08:21 [facility]       
    
Desktop 23 08:23 [facility]       
    
    
    
    
  23    
    
  08:21    
     
Wound Center Nurse 1     
     
#1 R Axilla cluster     
     
 -Combined with other wound No    
     
 -Current Size (cm) - Length 0.1    
     
 -Current Size (cm) - Width 0.1    
     
 -Current Size (cm) - Depth 0.1    
     
 -Total Square Cm 0.01    
     
 -Date of Last Picture (Recall this 23    
    
field)     
     
 -Photo Taken Yes    
     
 -Epithelialization Large %    
     
 -Tunneling No    
     
 -Undermining/Tunneling No    
     
 -Circular Undermining No    
     
 -Exudate Amt None Present    
     
 -Texture (Lilian-wound Skin Appearance) Assessed,    
    
 Scarring    
     
 -Moisture (Lilian-wound Skin Appearance) Assessed,Dry/    
    
 Scaly    
     
 -Color (Lilian-wound Skin Appearance) Assessed    
     
 -Temperature (Lilian-wound Skin No Abnormality    
    
Appearance) (Pt Warm)    
     
 -Tenderness on Palpation (Lilian-wound No    
    
Skin Appearance)     
     
 -Ulcer Cleansing Rinsed/    
    
 Irrigated with    
    
 Saline    
     
 -Foul Odor after Cleansing No    
     
 -Anesthetic Used 5% Lidocaine    
    
 Gel    
    
    
 - Nurse 2 - General Ulcer CM Notes                 Start:  23 08:19    
Freq:                                                 Status: Active            
Protocol:                                                                       
    
    
Activity Type Activity Date Activity User E-sign Co-sign Detail    
    
Recorded Client Recorded Date Recorded By      
     
Document 23 09:03        
    
Laptop 23 09:03        
    
    
    
    
  23    
    
  09:03    
     
Wound Center Nurse 2     
     
 -Correct Patient No    
     
 -Correct Side, Site, Position No    
     
 -Correct Procedure No    
     
 -Procedure Performed No    
     
 -Post Debridement (cm) - Length 0    
     
 -Post Debridement (cm) - Width 0    
     
 -Post Debridement (cm) - Depth 0    
     
 -Total Square (Post) (cm) 0    
     
 -Area of Debridement (cm) - Length 0    
     
 -Area of Debridement (cm) - Width 0    
     
 -Total Square (Area) (cm) 0    
     
 -Wound/Ulcer Outcome Healed-    
    
 Epithelialized    
     
Pain Scale: 0-10 Numeric     
     
Is Patient Pain Free? Yes    
    
    
 - Nurse 3 - General Ulcer D/C NN                   Start:  23 08:19    
Freq:                                                 Status: Active            
Protocol:                                                                       
    
    
Activity Type Activity Date Activity User E-sign Co-sign Detail    
    
Recorded Client Recorded Date Recorded By      
     
Document 23 08:19 [facility]       
    
Desktop 23 08:20 [facility]       
     
Document 23 09:04        
    
Laptop 23 09:04        
    
    
    
    
  23    
    
  08:19 09:04    
     
Vital Signs      
     
Temperature (97.8 F-99.1 F) 96.9 F L     
     
Temperature Source Temporal     
     
Pulse Rate () 69     
     
Pulse Location Monitor     
     
Respiratory Rate (12-18) 16     
     
Respiratory rate source Observation     
     
Oxygen Delivery Method Room Air     
     
Blood Pressure (90//80) 125/63 H     
     
Blood Pressure Mean (mm Hg) 83     
     
Source Monitor     
     
Position Sitting     
     
Blood Pressure Location Left Arm     
     
Pain Scale: 0-10 Numeric      
     
Is Patient Pain Free? Yes Yes    
     
Wound Care Center Nurse 3      
     
#1 R Axilla cluster      
     
 -Ulcer Cleansing Rinsed/     
    
 Irrigated with     
    
 Saline     
     
 -Foul Odor after Cleansing No     
     
 -Primary Dressing Applied Promogran     
    
 Randa Matter     
     
 -Other Dressing secured w/     
    
 bandaid. ok'd     
    
 per cm     
     
 -Primary Dressing Covered/Secured with Dry Gauze     
     
 -Promogran Randa Matter 1     
     
Treatment Response Procedure     
    
 Tolerated Well     
     
WC - Visit Discharge      
     
Discharge Condition Stable Stable    
     
Ambulatory Status Ambulatory Ambulatory    
     
Transportation Private Auto Private Auto    
     
Medication Reconcilliation completed &  Yes    
    
provided to patient/care provider      
     
Clinical Summary of Care Provided  Yes    
    
    
    
    
    
Assessment/Plan    
Assessment/Plan    
(1) Skin ulcer of axilla with fat layer exposed:     
CODE(S):       L98.492 - Non-pressure chronic ulcer of skin of other sites with   
fat layer exposed    
(2) Right axillary hidradenitis:     
CODE(S):       L73.2 - Hidradenitis suppurativa    
(3) Stiffness of right shoulder joint:     
CODE(S):       M25.611 - Stiffness of right shoulder, not elsewhere classified    
PLAN:     
Plan    
Patient evaluated at the wound healing center today.    
She is healed today.    
Instructed her to place lotion a couple times a day onto the healed ulcer and   
massage to help soften the scarring.    
She continues to have decreased range of motion of her right shoulder.    
Encouraged her to continue her PT but also to do her home excerises to help get   
as much ROM of her shoulder as possible.     
She has yet to make an appointment to see a counselor to help her with her   
anxiety.    
She has seen her PCP and followed up with her.    
    
    
A wound culture was obtained on 23 and was positive for MSSA.  She was   
initially started on Augmentin but then she said she was allergic to it (unsure   
of what her allergy is), her antibiotic was changed to Doxycycline, which she   
completed.    
Operative culture shows JOHN. She completed the Linezolid.    
    
She has returned to work.  She states that she is able to do her job.    
    
Follow up as needed.

## 2024-01-15 ENCOUNTER — HOSPITAL ENCOUNTER (OUTPATIENT)
Dept: HOSPITAL 100 - WC | Age: 54
LOS: 16 days | Discharge: HOME | End: 2024-01-31
Payer: COMMERCIAL

## 2024-01-15 DIAGNOSIS — Z09: Primary | ICD-10-CM

## 2024-02-19 ENCOUNTER — APPOINTMENT (OUTPATIENT)
Dept: DERMATOLOGY | Facility: CLINIC | Age: 54
End: 2024-02-19
Payer: COMMERCIAL

## 2024-05-13 ENCOUNTER — OFFICE VISIT (OUTPATIENT)
Dept: DERMATOLOGY | Facility: CLINIC | Age: 54
End: 2024-05-13
Payer: COMMERCIAL

## 2024-05-13 DIAGNOSIS — L20.9 ATOPIC DERMATITIS, UNSPECIFIED TYPE: Primary | ICD-10-CM

## 2024-05-13 PROCEDURE — 99213 OFFICE O/P EST LOW 20 MIN: CPT | Performed by: NURSE PRACTITIONER

## 2024-05-13 RX ORDER — DICLOFENAC SODIUM 10 MG/G
GEL TOPICAL 4 TIMES DAILY
COMMUNITY

## 2024-05-13 RX ORDER — TACROLIMUS 1 MG/G
OINTMENT TOPICAL
COMMUNITY
Start: 2018-05-15

## 2024-05-13 RX ORDER — CLOBETASOL PROPIONATE 0.5 MG/G
OINTMENT TOPICAL 2 TIMES DAILY
COMMUNITY
Start: 2024-04-17

## 2024-05-13 RX ORDER — OLOPATADINE HYDROCHLORIDE 2 MG/ML
1 SOLUTION/ DROPS OPHTHALMIC
COMMUNITY
Start: 2024-04-17

## 2024-05-13 RX ORDER — TACROLIMUS 1 MG/G
OINTMENT TOPICAL 2 TIMES DAILY
Qty: 60 G | Refills: 1 | Status: SHIPPED | OUTPATIENT
Start: 2024-05-13 | End: 2025-05-13

## 2024-05-13 RX ORDER — CITALOPRAM 10 MG/1
10 TABLET ORAL
COMMUNITY
Start: 2023-10-23

## 2024-05-13 RX ORDER — FLUTICASONE PROPIONATE 50 MCG
SPRAY, SUSPENSION (ML) NASAL
COMMUNITY
Start: 2024-04-17

## 2024-05-13 RX ORDER — TRIAMCINOLONE ACETONIDE 1 MG/G
CREAM TOPICAL
COMMUNITY

## 2024-05-13 RX ORDER — VALACYCLOVIR HYDROCHLORIDE 1 G/1
1000 TABLET, FILM COATED ORAL 2 TIMES DAILY
COMMUNITY

## 2024-05-13 RX ORDER — PSEUDOEPHEDRINE HCL 30 MG
30-60 TABLET ORAL EVERY 6 HOURS PRN
COMMUNITY
Start: 2024-02-07

## 2024-05-13 NOTE — PROGRESS NOTES
Subjective     Janessa Pleitez is a 54 y.o. female who presents for the following: Eczema (Triamcinolone and clobetasol working well. Pt returns for follow up. ).     Review of Systems:  No other skin or systemic complaints other than what is documented elsewhere in the note.    The following portions of the chart were reviewed this encounter and updated as appropriate:          Skin Cancer History  No skin cancer on file.      Specialty Problems    None       Objective   Well appearing patient in no apparent distress; mood and affect are within normal limits.    A focused skin examination was performed. All findings within normal limits unless otherwise noted below.    Assessment/Plan   1. Atopic dermatitis, unspecified type  Left Elbow - Posterior, Left Forearm - Posterior, Right Elbow - Posterior, Right Forearm - Posterior  Erythematous scaly papules and plaques with overyling excoriation located symmetrically in the antecubital and popliteal fossae, elbows, bilateral wrists, and hands    Plan: Counseling.  I counseled the patient regarding the following:  Skin care: Patient should bathe using lukewarm water with a mild cleanser and moisturize immediately after. Emollients should be applied at least  2-3 times daily. Avoid scented detergents or fabric softeners. Keep fingernai ls short. Avoid excessive hand washing.  Expectations : The patient is aware that eczema is chronic in nature and can improve with moisturizers and topical steroids and worsen with stress,  scented soaps, detergents, scratching, dry skin, changes in weather and skin infections.  Contact office if:  Eczema worsens or fails to improve despite several weeks of treatment; patient develops skin infections (such as:yellow honey  colored crusts or cold sores).    PLAN:  Can continue triamcinolone 0.1% cream twice daily alternating with tacrolimus 0.1% ointment  RTC in 2 months     Related Medications  tacrolimus (Protopic) 0.1 % ointment  Apply  topically 2 times a day.

## 2024-07-15 ENCOUNTER — APPOINTMENT (OUTPATIENT)
Dept: DERMATOLOGY | Facility: CLINIC | Age: 54
End: 2024-07-15
Payer: COMMERCIAL

## 2024-07-15 DIAGNOSIS — L20.9 ATOPIC DERMATITIS, UNSPECIFIED TYPE: Primary | ICD-10-CM

## 2024-07-15 PROCEDURE — 99213 OFFICE O/P EST LOW 20 MIN: CPT | Performed by: NURSE PRACTITIONER

## 2024-07-15 RX ORDER — TRIAMCINOLONE ACETONIDE 1 MG/G
CREAM TOPICAL 2 TIMES DAILY PRN
Qty: 80 G | Refills: 5 | Status: SHIPPED | OUTPATIENT
Start: 2024-07-15

## 2024-07-15 NOTE — PROGRESS NOTES
Subjective     Janessa Pleitez is a 54 y.o. female who presents for the following: Eczema.   Established patient in for follow-up atopic dermatitis using triamcinolone, clobetasol and tacrolimus.    Review of Systems:  No other skin or systemic complaints other than what is documented elsewhere in the note.    The following portions of the chart were reviewed this encounter and updated as appropriate:       Skin Cancer History  No skin cancer on file.    Specialty Problems    None    Past Medical History:  Janessa Pleitez  has no past medical history on file.    Past Surgical History:  Janessa Pleitez  has no past surgical history on file.    Family History:  Patient family history is not on file.    Social History:  Janessa Pleitez  has no history on file for tobacco use, alcohol use, and drug use.    Allergies:  Azithromycin, Meclizine, Amoxicillin-pot clavulanate, Clindamycin, and Midazolam    Current Medications / CAM's:    Current Outpatient Medications:     citalopram (CeleXA) 10 mg tablet, Take 1 tablet (10 mg) by mouth once daily., Disp: , Rfl:     clobetasol (Temovate) 0.05 % ointment, Apply topically twice a day., Disp: , Rfl:     diclofenac sodium (Voltaren) 1 % gel, Apply topically 4 times a day., Disp: , Rfl:     fluticasone (Flonase) 50 mcg/actuation nasal spray, USE 1 (ONE) Spray in each nostril daily at bedtime., Disp: , Rfl:     olopatadine (Pataday) 0.2 % ophthalmic solution, Administer 1 drop into affected eye(s) once daily., Disp: , Rfl:     pseudoephedrine (Sudafed) 30 mg tablet, Take 1-2 tablets (30-60 mg) by mouth every 6 hours if needed., Disp: , Rfl:     tacrolimus (Protopic) 0.1 % ointment, 1 Application, Disp: , Rfl:     tacrolimus (Protopic) 0.1 % ointment, Apply topically 2 times a day., Disp: 60 g, Rfl: 1    triamcinolone (Kenalog) 0.1 % cream, Apply TWICE DAILY TO THE AFFECTED AREA(S) for up to 2 (TWO) weeks at a time or until clear if needed for itch, Disp: , Rfl:      triamcinolone (Kenalog) 0.1 % cream, Apply topically 2 times a day as needed for rash., Disp: 80 g, Rfl: 5    valACYclovir (Valtrex) 1 gram tablet, Take 1 tablet (1,000 mg) by mouth twice a day., Disp: , Rfl:      Objective   Well appearing patient in no apparent distress; mood and affect are within normal limits.      Assessment/Plan   1. Atopic dermatitis, unspecified type  Left Elbow - Posterior, Left Forearm - Posterior, Right Elbow - Posterior, Right Forearm - Posterior  Erythematous scaly papules and plaques with overyling excoriation located to anterior chest, hands, and occasionally face.  Patient is mild on exam today with only 1% BSA. We had a long discussion on how and when to apply topicals, but overall she is doing well    Plan: Counseling.  I counseled the patient regarding the following:  Skin care: Patient should bathe using lukewarm water with a mild cleanser and moisturize immediately after. Emollients should be applied at least  2-3 times daily. Avoid scented detergents or fabric softeners. Keep fingernai ls short. Avoid excessive hand washing.  Expectations : The patient is aware that eczema is chronic in nature and can improve with moisturizers and topical steroids and worsen with stress,  scented soaps, detergents, scratching, dry skin, changes in weather and skin infections.  Contact office if:  Eczema worsens or fails to improve despite several weeks of treatment; patient develops skin infections (such as:yellow honey  colored crusts or cold sores).    PLAN:  Can continue triamcinolone 0.1% cream twice daily alternating with tacrolimus 0.1% ointment twice daily as needed  RTC in 3 months      Related Medications  tacrolimus (Protopic) 0.1 % ointment  Apply topically 2 times a day.    triamcinolone (Kenalog) 0.1 % cream  Apply topically 2 times a day as needed for rash.

## 2024-08-12 VITALS
DIASTOLIC BLOOD PRESSURE: 85 MMHG | TEMPERATURE: 98 F | RESPIRATION RATE: 18 BRPM | SYSTOLIC BLOOD PRESSURE: 144 MMHG | HEART RATE: 92 BPM

## 2024-08-12 NOTE — HP.PCM_ITS
History of Present Illness    
Date of Service: 24    
Chief Complaint: right axilla pain and redness    
History of Wound: 54 year old woman who is known to me.  She previously had   
excision of right axilla hidradenitis .    She works in Housekeeping at   
[employer] and states she may be allergic to the chemicals used for   
cleaning.  She has a history of eczema and had been on Dupixent in the past.    
She has seen a Dermatologist in the past for the eczema. She also has a history   
of anxiety. She denies going to counseling as recommended in the past.  She   
states that she needs a new place to live because the shashi she is living with is   
mean and tells her she is fat.      
     
Today she comes in with concerns of pain and erythema of her right axilla that   
has been going on for several weeks.  She states that it is very painful and   
there is an odor.      
     
     
At present, she denies fever, chills, nausea, vomiting.    
Progress of Wound:     
No open wound. Right axilla is pink and shiny.  Slight white/cheesy discharge in  
crease of axilla.Clinically looks like yeast.    
    
[facility]    
Medical History (Updated 24 @ 12:48 by Vickie Hauser NP, NP-C)    
    
Open wound of right axillary region    
Wears glasses    
Post-menopausal    
Anxiety    
Anemia    
Restless legs    
Non-smoker    
Bunion    
Eczema    
Seasonal allergies    
    
    
                                Home Medications    
    
    
    
?Medication ?Instructions ?Recorded ?Last Taken ?Type    
     
docusate sodium 100 mg capsule 100 mg PO BID 30 days #60 caps 23 Unknown   
Rx    
    
(Colace)        
     
doxycycline monohydrate 100 mg 100 mg PO BID 14 days #28 tabs 23 Unknown   
Rx    
    
tablet        
     
oxycodone-acetaminophen 5 mg-325 1 tab PO 4X/DAY PRN pain (scale 23   
Unknown Rx    
    
mg tablet (Endocet) score 7-10) 7 days #28 tabs       
     
promethazine 25 mg tablet 25 mg PO TID PRN nausea and 23 Unknown Rx    
    
 vomiting 10 days #30 tabs       
     
diazepam 5 mg tablet (Valium) 5 mg PO BID PRN muscle spasm 7 23 Unknown Rx    
    
 days #14 tabs       
     
gabapentin 100 mg capsule 200 mg (2 x 100 mg) PO BID 30 days 23 Unknown Rx    
    
(Neurontin) #120 caps       
     
escitalopram oxalate 10 mg tablet 10 mg PO DAILY 30 days #30 tabs 05/10/23   
Unknown Rx    
    
(Lexapro)        
     
doxycycline monohydrate 100 mg 100 mg PO BID 14 days #28 tabs 23 Unknown   
Rx    
    
tablet        
     
nystatin 100,000 unit/gram topical 1 applic topical BID 14 days #30 24   
Unknown Rx    
    
powder grams       
    
    
    
                                            
    
    
    
Allergy/AdvReac Type Severity Reaction Status Date / Time    
     
azithromycin Allergy Intermediate Hives Verified 23 08:55    
     
clindamycin Allergy Intermediate Hives Verified 23 08:55    
     
midazolam (From Versed) Allergy  Rash Verified 23 08:55    
    
    
    
Family History (Reviewed 24 @ 11:30 by Vickie Hauser NP, NP-C)    
Son   Epilepsy    
   Seizures    
    
    
Surgical History (Reviewed 24 @ 11:30 by Vickie Hauser NP, NP-C)    
    
History of bunionectomy    
    
    
Social History (Reviewed 24 @ 11:30 by Vickie Hauser NP, NP-C)    
Smoking Status:  Never smoker     
alcohol intake:  never     
substance use type:  does not use     
additional social history:  Does Take Aspirin     
Does Take Ibuprofen     
    
    
    
ROS    
Constitutional    
Constitutional: Denies fever(s)    
Eyes    
Eyes: Reports none    
ENT    
HEENT: Reports none    
Cardiovascular    
Cardiovascular: Denies chest pain or dyspnea    
Respiratory/Chest    
Respiratory/Chest: Denies cough or dyspnea    
Gastrointestinal    
Gastrointestinal: Reports none    
Musculoskeletal    
Musculoskeletal: Reports as per HPI    
Integumentary    
Integumentary: Reports as per HPI    
Neurologic    
Neurologic: Denies headache(s)    
Psychiatric    
Psychiatric: Reports anxiety    
Endocrine    
Endocrinology: Reports none    
Allergic/Immunologic    
Allergic/Immunologic: Reports as per HPI and eczemia    
    
Vital Signs    
Vital Signs    
Vital Signs:     
                                            
    
    
    
 24    
09:38    
     
Temperature 98.0 F    
     
Temperature Source Temporal    
     
Pulse Rate 92    
     
Respiratory Rate 18    
     
Blood Pressure 144/85 H    
     
Blood Pressure Mean 104    
     
Blood Pressure Source Monitor    
     
Blood Pressure Position Semi-Fowlers    
     
Blood Pressure Location Left Arm    
     
Oxygen Delivery Method Room Air    
    
    
                                     Weight    
    
    
    
Weight:                        136 lb                                             
    
     
Body Mass Index (BMI)          23.3                                               
    
    
    
    
    
    
Physical Exam    
Const    
alert and oriented x3    
General Appearance: anxious and other Weepy.    
HEENT    
normocephalic    
Head and Scalp: atraumatic    
Eyes    
General Eye: normal appearance of both eyes    
Resp    
normal respiratory effort and clear to auscultation bilaterally    
Effort and Inspection: able to speak in complete sentences    
Cardio    
regular rate and regular rhythm    
Back/Spine    
normal ROM    
Extremity    
normal capillary refill    
Extremity Narrative:     
Right shoulder still does not have full range of motion.    
Peripheral Pulses: Yes pulses 2+ throughout    
Skin    
Skin Narrative:     
No open wound. Right axilla is pink and shiny.  Slight thick, white, cheesy   
discharge in crease of axilla. Clinically looks like yeast.    
Neuro    
CN's II-XII intact bilaterally    
Psych    
cooperative    
Psych Narrative:     
She is anxious and weepy.    
Appearance: well kempt    
    
Debridement Note    
Debridement Note    
No debridement was completed: No debridement was completed today    
Post-Debridement Measurements and Additional Note:     
Post-Debridement Measurements/Treatment    
    
 - Nurse 1 - General Ulcer Assessment               Start:  24 09:38    
Freq:                                                 Status: Active            
Protocol:  AVERY.SHRUTHI                                                            
    
    
Activity Type Activity Date Activity User E-sign Co-sign Detail    
    
Recorded Client Recorded Date Recorded By      
     
Document 24 09:38 KW       
    
][ 24 09:50 KW       
    
    
    
    
  24    
    
  09:38    
     
 - Today's Visit Information     
     
Type of service Initial Visit    
     
Arrival Mode Ambulatory    
     
Patient Identification Verified (Name & Yes    
    
)     
     
Height and Weight     
     
Height 5 ft 4 in    
     
Weight 136 lb    
     
Weight in Pounds 136.0 lbs    
     
Weight Measurement Method Estimated by    
    
 Patient    
     
Body Mass Index (BMI) 23.3    
     
BMI Classification Normal    
     
BSA - Helen 1.66    
     
Vital Signs     
     
Temperature (97.8 F-99.1 F) 98.0 F    
     
Temperature Source Temporal    
     
Pulse Rate () 92    
     
Pulse Location Monitor    
     
Respiratory Rate (12-18) 18    
     
Respiratory rate source Observation    
     
Oxygen Delivery Method Room Air    
     
Blood Pressure (90//80) 144/85 H    
     
Blood Pressure Mean 104    
     
Source Monitor    
     
Position Semi-Fowlers    
     
Blood Pressure Location Left Arm

## 2024-08-12 NOTE — PCM.WC.HP
History of Present Illness
Date of Service: 24
Chief Complaint: right axilla pain and redness
History of Wound: 54 year old woman who is known to me.  She previously had excision of right axilla hidradenitis .    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  She has 
a history of eczema and had been on Dupixent in the past.  She has seen a Dermatologist in the past for the eczema. She also has a history of anxiety. She denies going to counseling as recommended in the past.  She states that she needs a new place 
to live because the shashi she is living with is mean and tells her she is fat.  
 
Today she comes in with concerns of pain and erythema of her right axilla that has been going on for several weeks.  She states that it is very painful and there is an odor.  
 
 
At present, she denies fever, chills, nausea, vomiting.
Progress of Wound: 
No open wound. Right axilla is pink and shiny.  Slight white/cheesy discharge in crease of axilla.Clinically looks like yeast.

[facility]
Medical History (Updated 24 @ 12:48 by Vickie Hauser NP, NP-C)

Open wound of right axillary region
Wears glasses
Post-menopausal
Anxiety
Anemia
Restless legs
Non-smoker
Bunion
Eczema
Seasonal allergies


Home Medications

?Medication ?Instructions ?Recorded ?Last Taken ?Type
docusate sodium 100 mg capsule 100 mg PO BID 30 days #60 caps 23 Unknown Rx
(Colace)    
doxycycline monohydrate 100 mg 100 mg PO BID 14 days #28 tabs 23 Unknown Rx
tablet    
oxycodone-acetaminophen 5 mg-325 1 tab PO 4X/DAY PRN pain (scale 23 Unknown Rx
mg tablet (Endocet) score 7-10) 7 days #28 tabs   
promethazine 25 mg tablet 25 mg PO TID PRN nausea and 23 Unknown Rx
 vomiting 10 days #30 tabs   
diazepam 5 mg tablet (Valium) 5 mg PO BID PRN muscle spasm 7 23 Unknown Rx
 days #14 tabs   
gabapentin 100 mg capsule 200 mg (2 x 100 mg) PO BID 30 days 23 Unknown Rx
(Neurontin) #120 caps   
escitalopram oxalate 10 mg tablet 10 mg PO DAILY 30 days #30 tabs 05/10/23 Unknown Rx
(Lexapro)    
doxycycline monohydrate 100 mg 100 mg PO BID 14 days #28 tabs 23 Unknown Rx
tablet    
nystatin 100,000 unit/gram topical 1 applic topical BID 14 days #30 24 Unknown Rx
powder grams   




Allergy/AdvReac Type Severity Reaction Status Date / Time
azithromycin Allergy Intermediate Hives Verified 23 08:55
clindamycin Allergy Intermediate Hives Verified 23 08:55
midazolam (From Versed) Allergy  Rash Verified 23 08:55


Family History (Reviewed 24 @ 11:30 by Vickie Hauser NP, NP-C)
Son
 Epilepsy
 Seizures


Surgical History (Reviewed 24 @ 11:30 by Vickie Hauser NP, NP-C)

History of bunionectomy


Social History (Reviewed 24 @ 11:30 by Vickie Hauser NP, NP-C)
Smoking Status:  Never smoker 
alcohol intake:  never 
substance use type:  does not use 
additional social history:  Does Take Aspirin 
Does Take Ibuprofen 



ROS
Constitutional
Constitutional: Denies fever(s)
Eyes
Eyes: Reports none
ENT
HEENT: Reports none
Cardiovascular
Cardiovascular: Denies chest pain or dyspnea
Respiratory/Chest
Respiratory/Chest: Denies cough or dyspnea
Gastrointestinal
Gastrointestinal: Reports none
Musculoskeletal
Musculoskeletal: Reports as per HPI
Integumentary
Integumentary: Reports as per HPI
Neurologic
Neurologic: Denies headache(s)
Psychiatric
Psychiatric: Reports anxiety
Endocrine
Endocrinology: Reports none
Allergic/Immunologic
Allergic/Immunologic: Reports as per HPI and eczemia

Vital Signs
Vital Signs
Vital Signs: 


 24
09:38
Temperature 98.0 F
Temperature Source Temporal
Pulse Rate 92
Respiratory Rate 18
Blood Pressure 144/85 H
Blood Pressure Mean 104
Blood Pressure Source Monitor
Blood Pressure Position Semi-Fowlers
Blood Pressure Location Left Arm
Oxygen Delivery Method Room Air

Weight

Weight:                        136 lb                                            
Body Mass Index (BMI)          23.3                                              




Physical Exam
Const
alert and oriented x3
General Appearance: anxious and other Weepy.
HEENT
normocephalic
Head and Scalp: atraumatic
Eyes
General Eye: normal appearance of both eyes
Resp
normal respiratory effort and clear to auscultation bilaterally
Effort and Inspection: able to speak in complete sentences
Cardio
regular rate and regular rhythm
Back/Spine
normal ROM
Extremity
normal capillary refill
Extremity Narrative: 
Right shoulder still does not have full range of motion.
Peripheral Pulses: Yes pulses 2+ throughout
Skin
Skin Narrative: 
No open wound. Right axilla is pink and shiny.  Slight thick, white, cheesy discharge in crease of axilla. Clinically looks like yeast.
Neuro
CN's II-XII intact bilaterally
Psych
cooperative
Psych Narrative: 
She is anxious and weepy.
Appearance: well kempt

Debridement Note
Debridement Note
No debridement was completed: No debridement was completed today
Post-Debridement Measurements and Additional Note: 
Post-Debridement Measurements/Treatment

 - Nurse 1 - General Ulcer Assessment               Start:  24 09:38
Freq:                                                 Status: Active        
Protocol:  AVERY.SHRUTHI                                                        
Activity Type Activity Date Activity User E-sign Co-sign Detail
Recorded Client Recorded Date Recorded By   
Document 24 09:38 KW   
][ 24 09:50 KW   

  24
  09:38
 - Today's Visit Information 
Type of service Initial Visit
Arrival Mode Ambulatory
Patient Identification Verified (Name & Yes
) 
Height and Weight 
Height 5 ft 4 in
Weight 136 lb
Weight in Pounds 136.0 lbs
Weight Measurement Method Estimated by
 Patient
Body Mass Index (BMI) 23.3
BMI Classification Normal
BSA - Helen 1.66
Vital Signs 
Temperature (97.8 F-99.1 F) 98.0 F
Temperature Source Temporal
Pulse Rate () 92
Pulse Location Monitor
Respiratory Rate (12-18) 18
Respiratory rate source Observation
Oxygen Delivery Method Room Air
Blood Pressure (90//80) 144/85 H
Blood Pressure Mean 104
Source Monitor
Position Semi-Fowlers
Blood Pressure Location Left Arm
History Since Last Visit- (Skip if this 
is Patient's initial visit) 
Left Footwear Regular Shoe
Right Footwear Regular Shoe
Pain Scale: 0-10 Numeric 
Is Patient Pain Free? Yes
Communication Assessment 
Preferred language English
 Required No
Able to Read Yes
Able to Write Yes
Right Hearing Abillity Normal
Left Hearing Abillity Normal
Visual Assistive Devices None
Teaching Assessment 
Preferences Verbal,Written,
 Demonstration
Barriers to Learning None
Readiness To Learn Excellent
Willingness to Engage in Self Management High
Activies 
Readiness to Engage in Self Management High
Activities 
Anxiety Level Anxious
Cooperation Cooperative
Perception Coherent
Interest in Health Problem Asks Questions
Education Importance Acknowledges
 Need
Does Patient Smoke tobacco or other No
substances 
Is Patient Diabetic No
Functional Assessment 
Recent Decline in Ability to Perform Denies Any
 Declines
Culture/Roman Catholic/ 
Cultural/Roman Catholic Needs that may affect No
Treatment Plan 
Would you allow our hospital  to No
meet you for the purpose of spiritual/ 
emotional support? 
 to contact place of Yarsanism No

WC - Nurse 1 - General Ulcer Measurement              Start:  24 09:38
Freq:                                                 Status: Active        
Protocol:                                                                   
Activity Type Activity Date Activity User E-sign Co-sign Detail
Recorded Client Recorded Date Recorded By   
Document 24 09:38 KW   
][ 24 09:50 KW   

  24
  09:38
Wound Center Nurse 1 
#2 RT AXILLA 
 -Current Size (cm) - Length 0.1
 -Current Size (cm) - Width 0.1
 -Current Size (cm) - Depth 0.1
 -Total Square Cm 0.01
 -Date of Last Picture (Recall this 24
field) 
 -Exudate Amt Small
 -Exudate Type Serosanguineous
 -Wound Margin Indistinct, Non
 -Visible
 -Granulation Amt Large (%)
 -Granulation Quality Red
 -Texture (Lilian-wound Skin Appearance) Assessed
 -Moisture (Lilian-wound Skin Appearance) Assessed
 -Color (Lilian-wound Skin Appearance) Assessed
 -Temperature (Lilian-wound Skin No Abnormality
Appearance) (Pt Warm)
 -Tenderness on Palpation (Lilian-wound No
Skin Appearance) 
 -Ulcer Cleansing Rinsed/
 Irrigated with
 Saline
 -Foul Odor after Cleansing No

WC - Nurse 2 - General Ulcer CM Notes                 Start:  24 09:38
Freq:                                                 Status: Active        
Protocol:                                                                   
Activity Type Activity Date Activity User E-sign Co-sign Detail
Recorded Client Recorded Date Recorded By   
Document 24 10:13 JF   
[id number] 24 10:14    

  24
  10:13
Wound Center Nurse 2 
 -Correct Patient No
 -Correct Side, Site, Position No
 -Correct Procedure No
Pain Scale: 0-10 Numeric 
Is Patient Pain Free? Yes




Charges/Coding
Visit Charges
Office Visits / Consults: [id number] OV L3 Est 20min

Assessment/Plan
Assessment/Plan
(1) Candidal skin infection: 
CODE(S):       B37.2 - Candidiasis of skin and nail
(2) Stiffness of right shoulder joint: 
CODE(S):       M25.611 - Stiffness of right shoulder, not elsewhere classified
(3) Anxiety: 
CODE(S):       F41.9 - Anxiety disorder, unspecified
PLAN: 
Plan
Patient evaluated at the wound healing center today.
She does not have an open wound.  Her right axilla clinically looks like yeast.
Treatment will be nystatin powder twice daily.  Instructed patient that she needs to wash this at least once daily and pat dry.  Apply powder after that.
Patient is very tearful today.  She states that she needs to find a new place to live because the shashi she is living with is very mean to her.  She also has not been going to counseling as recommended in the past.  Referred her to [facility] to see 
if they can help her with both counseling and home placement. 
She will follow-up in 2 weeks because that works best for her work schedule.

## 2024-08-21 ENCOUNTER — HOSPITAL ENCOUNTER (OUTPATIENT)
Dept: HOSPITAL 100 - WC | Age: 54
LOS: 1 days | Discharge: HOME | End: 2024-08-22
Payer: COMMERCIAL

## 2024-08-21 VITALS — BODY MASS INDEX: 23.3 KG/M2

## 2024-08-21 VITALS
SYSTOLIC BLOOD PRESSURE: 131 MMHG | RESPIRATION RATE: 18 BRPM | DIASTOLIC BLOOD PRESSURE: 87 MMHG | HEART RATE: 71 BPM | TEMPERATURE: 97.16 F

## 2024-08-21 DIAGNOSIS — B37.2: ICD-10-CM

## 2024-08-21 DIAGNOSIS — Z79.899: ICD-10-CM

## 2024-08-21 DIAGNOSIS — L30.9: ICD-10-CM

## 2024-08-21 DIAGNOSIS — M25.611: Primary | ICD-10-CM

## 2024-08-21 DIAGNOSIS — Z78.0: ICD-10-CM

## 2024-08-21 DIAGNOSIS — F41.9: ICD-10-CM

## 2024-08-21 PROCEDURE — 99213 OFFICE O/P EST LOW 20 MIN: CPT

## 2024-08-21 PROCEDURE — G0463 HOSPITAL OUTPT CLINIC VISIT: HCPCS

## 2024-08-21 NOTE — PN.PCM_ITS
History of Present Illness    
Date of Service: 24    
Chief Complaint: right axilla pain and redness    
History of Wound: 54 year old woman who is known to me.  She previously had   
excision of right axilla hidradenitis .    She works in Housekeeping at   
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   
cleaning.  She has a history of eczema and had been on Dupixent in the past.    
She has seen a Dermatologist in the past for the eczema. She also has a history   
of anxiety. She denies going to counseling as recommended in the past.  She   
states that she needs a new place to live because the shashi she is living with is   
mean and tells her she is fat.      
     
Today she comes in with concerns of pain and erythema of her right axilla that   
has been going on for several weeks.  She states that it is very painful and   
there is an odor.      
     
     
At present, she denies fever, chills, nausea, vomiting.    
Progress of Wound:     
No open wound. Right axilla yeast rash, pink rash, has resolved with the use of   
nystatin powder.  She states she still has a couple more days left of the using   
the powder.  She has concern that she still sometimes perspires in her right   
axilla, even though her sweat glands were removed.     
    
Subjective    
Subjective    
Patient states that her right axilla rashes much improved since starting the   
nystatin powder.    
    
Objective Data    
Objective Data    
Vital Signs:     
Vital Signs    
    
    
    
Temp Pulse Resp BP O2 Del Method    
     
 97.1 F L  71   18   131/87 H  Room Air     
     
 24 13:42  24 13:42  24 13:42  24 13:42  24 09:38    
    
    
    
    
Oxygen Delivery Method           Room Air                                         
       
Weight:                          136 lb                                           
       
Body Mass Index (BMI)            23.3                                             
       
    
    
    
Charges/Coding    
Visit Charges    
Office Visits / Consults: 09601 OV L3 Est 20min    
    
Physical Exam    
Const    
alert and oriented x3    
General Appearance: anxious and other Weepy.    
HEENT    
normocephalic    
Head and Scalp: atraumatic    
Eyes    
General Eye: normal appearance of both eyes    
Resp    
normal respiratory effort and clear to auscultation bilaterally    
Effort and Inspection: able to speak in complete sentences    
Cardio    
regular rate and regular rhythm    
Back/Spine    
normal ROM    
Extremity    
normal capillary refill    
Extremity Narrative:     
Right shoulder still does not have full range of motion.    
Peripheral Pulses: Yes pulses 2+ throughout    
Skin    
Skin Narrative:     
Right axilla rash has resolved with the use of nystatin powder.    
Neuro    
CN's II-XII intact bilaterally    
Psych    
cooperative    
Psych Narrative:     
She is anxious and weepy.    
Appearance: well kempt    
    
Debridement Note    
Debridement Note    
Post-Debridement Measurements and Additional Note:     
Post-Debridement Measurements/Treatment    
    
 - Nurse 1 - General Ulcer Assessment               Start:  24 09:38    
Freq:                                                 Status: Active            
Protocol:  ODIN                                                            
    
    
Activity Type Activity Date Activity User E-sign Co-sign Detail    
    
Recorded Client Recorded Date Recorded By      
     
Document 24 09:38 KW       
    
][ 24 09:50 KW       
     
Document 24 13:42 RB       
    
[id number] 24 13:43 RB       
    
    
    
    
  24    
    
  09:38 13:42    
     
 - Today's Visit Information      
     
Type of service Initial Visit Follow-up Visit    
    
  (Physician/CNP    
    
  )    
     
Arrival Mode Ambulatory Ambulatory    
     
Transfer Assistance  None    
     
Patient Identification Verified (Name & Yes Yes    
    
)      
     
Patient Requires Transmission-Based  No    
    
Precautions      
     
Height and Weight      
     
Height 5 ft 4 in     
     
Weight 136 lb     
     
Weight in Pounds 136.0 lbs     
     
Weight Measurement Method Estimated by     
    
 Patient     
     
Body Mass Index (BMI) 23.3 23.3    
     
BMI Classification Normal Normal    
     
BSA - Helen 1.66     
     
Vital Signs      
     
Temperature (97.8 F-99.1 F) 98.0 F 97.1 F L    
     
Temperature Source Temporal Temporal    
     
Pulse Rate () 92 71    
     
Pulse Location Monitor Monitor    
     
Respiratory Rate (12-18) 18 18    
     
Respiratory rate source Observation Observation    
     
Oxygen Delivery Method Room Air     
     
Blood Pressure (90//80) 144/85 H 131/87 H    
     
Blood Pressure Mean (mm Hg) 104 101    
     
Source Monitor Monitor    
     
Position Semi-Fowlers Semi-Fowlers    
     
Blood Pressure Location Left Arm Left Arm    
     
History Since Last Visit- (Skip if this      
    
is Patient's initial visit)      
     
Have you changed medications since your  No    
    
last visit?      
     
Any new allergies or adverse reactions  No    
     
Had a fall/change in ADL's that may  No    
    
increase risk of falls      
     
Signs or symptoms of abuse and/or  No    
    
neglect since last visit      
     
Have you been in the hospital since your  No    
    
last visit?      
     
Has dressing in place as prescribed  No    
     
Has compression in place as prescribed  No    
     
Has offloadiing in place as prescribed  No    
     
Experienced any changes in pain level or  No    
    
management      
     
Left Footwear Regular Shoe     
     
Right Footwear Regular Shoe     
     
Pain Scale: 0-10 Numeric      
     
Is Patient Pain Free? Yes Yes    
     
Communication Assessment      
     
Preferred language English     
     
 Required No     
     
Able to Read Yes     
     
Able to Write Yes     
     
Right Hearing Abillity Normal     
     
Left Hearing Abillity Normal     
     
Visual Assistive Devices None     
     
Teaching Assessment      
     
Preferences Verbal,Written,     
    
 Demonstration     
     
Barriers to Learning None     
     
Readiness To Learn Excellent     
     
Willingness to Engage in Self Management High     
    
Activies      
     
Readiness to Engage in Self Management High     
    
Activities      
     
Anxiety Level Anxious     
     
Cooperation Cooperative     
     
Perception Coherent     
     
Interest in Health Problem Asks Questions     
     
Education Importance Acknowledges     
    
 Need     
     
Does Patient Smoke tobacco or other No     
    
substances      
     
Is Patient Diabetic No     
     
Functional Assessment      
     
Recent Decline in Ability to Perform Denies Any     
    
 Declines     
     
Culture/Anglican/      
     
Cultural/Anglican Needs that may affect No     
    
Treatment Plan      
     
Would you allow our hospital  to No     
    
meet you for the purpose of spiritual/      
    
emotional support?      
     
 to contact place of Zoroastrianism No     
    
    
    
    
24 13:43 Wound Center by Yani Rider    
pt Right axilla no wound noted. skin intact. no drainage.     
    
    
Initialized on 24 13:43 - END OF NOTE    
    
    
WC - Nurse 1 - General Ulcer Measurement              Start:  24 09:38    
Freq:                                                 Status: Active            
Protocol:                                                                       
    
    
Activity Type Activity Date Activity User E-sign Co-sign Detail    
    
Recorded Client Recorded Date Recorded By      
     
Document 24 09:38 KW       
    
][ 24 09:50 KW       
    
    
    
    
  24    
    
  09:38    
     
Wound Center Nurse 1     
     
#2 RT AXILLA     
     
 -Current Size (cm) - Length 0.1    
     
 -Current Size (cm) - Width 0.1    
     
 -Current Size (cm) - Depth 0.1    
     
 -Total Square Cm 0.01    
     
 -Date of Last Picture (Recall this 24    
    
field)     
     
 -Exudate Amt Small    
     
 -Exudate Type Serosanguineous    
     
 -Wound Margin Indistinct, Non    
    
 -Visible    
     
 -Granulation Amt Large (%)    
     
 -Granulation Quality Red    
     
 -Texture (Lilian-wound Skin Appearance) Assessed    
     
 -Moisture (Lilian-wound Skin Appearance) Assessed    
     
 -Color (Lilian-wound Skin Appearance) Assessed    
     
 -Temperature (Lilian-wound Skin No Abnormality    
    
Appearance) (Pt Warm)    
     
 -Tenderness on Palpation (Lilian-wound No    
    
Skin Appearance)     
     
 -Ulcer Cleansing Rinsed/    
    
 Irrigated with    
    
 Saline    
     
 -Foul Odor after Cleansing No    
    
    
WC - Nurse 2 - General Ulcer CM Notes                 Start:  24 09:38    
Freq:                                                 Status: Active            
Protocol:                                                                       
    
    
Activity Type Activity Date Activity User E-sign Co-sign Detail    
    
Recorded Client Recorded Date Recorded By      
     
Document 24 10:13        
    
[id number] 24 10:14        
     
Document 24 13:50        
    
[id number] 24 13:51        
    
    
    
    
  24    
    
  10:13 13:50    
     
Wound Center Nurse 2      
     
 -Correct Patient No     
     
 -Correct Side, Site, Position No     
     
 -Correct Procedure No     
     
Pain Scale: 0-10 Numeric      
     
Is Patient Pain Free? Yes Yes    
    
    
 - Nurse 3 - General Ulcer D/C NN                   Start:  24 09:38    
Freq:                                                 Status: Active            
Protocol:                                                                       
    
    
Activity Type Activity Date Activity User E-sign Co-sign Detail    
    
Recorded Client Recorded Date Recorded By      
     
Document 24 13:52        
    
[id number] 24 13:52        
    
    
    
    
  24    
    
  13:52    
     
Is Patient Pain Free? Yes    
     
WC - Visit Discharge     
     
Discharge Condition Stable    
     
Ambulatory Status Ambulatory    
     
Transportation Private Auto    
     
Clinical Summary of Care Provided Yes    
     
Notes: no wounds, has    
    
 rx for nystatin    
    
 powder    
    
    
    
    
    
Assessment/Plan    
Assessment/Plan    
(1) Candidal skin infection:     
CODE(S):       B37.2 - Candidiasis of skin and nail    
(2) Stiffness of right shoulder joint:     
CODE(S):       M25.611 - Stiffness of right shoulder, not elsewhere classified    
(3) Anxiety:     
CODE(S):       F41.9 - Anxiety disorder, unspecified    
PLAN:     
Plan    
Patient evaluated at the wound healing center today.    
She does not have an open wound.  Her right axilla yeast rash has resolved with   
the use of nystatin powder.    
She states that the rash resolved over the past week.  Instructed her that she   
can continue the nystatin powder for 7 days after the rash completely resolved.    
She has concerns that she is still perspiring from her right axilla where she   
had her hidradenitis excised.  She thought that she should not sweat in this   
area any longer.  Educated her that she still has sweat glands and it is normal   
to perspire where there are sweat glands.    
Follow up as needed.

## 2024-08-21 NOTE — PCM.WC.PN
History of Present Illness
Date of Service: 24
Chief Complaint: right axilla pain and redness
History of Wound: 54 year old woman who is known to me.  She previously had excision of right axilla hidradenitis .    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  She has 
a history of eczema and had been on Dupixent in the past.  She has seen a Dermatologist in the past for the eczema. She also has a history of anxiety. She denies going to counseling as recommended in the past.  She states that she needs a new place 
to live because the shashi she is living with is mean and tells her she is fat.  
 
Today she comes in with concerns of pain and erythema of her right axilla that has been going on for several weeks.  She states that it is very painful and there is an odor.  
 
 
At present, she denies fever, chills, nausea, vomiting.
Progress of Wound: 
No open wound. Right axilla yeast rash, pink rash, has resolved with the use of nystatin powder.  She states she still has a couple more days left of the using the powder.  She has concern that she still sometimes perspires in her right axilla, even 
though her sweat glands were removed. 

Subjective
Subjective
Patient states that her right axilla rashes much improved since starting the nystatin powder.

Objective Data
Objective Data
Vital Signs: 
Vital Signs

Temp Pulse Resp BP O2 Del Method
 97.1 F L  71   18   131/87 H  Room Air 
 24 13:42  24 13:42  24 13:42  24 13:42  24 09:38



Oxygen Delivery Method         Room Air                                         
Weight:                        136 lb                                           
Body Mass Index (BMI)          23.3                                             



Charges/Coding
Visit Charges
Office Visits / Consults: 40106 OV L3 Est 20min

Physical Exam
Const
alert and oriented x3
General Appearance: anxious and other Weepy.
HEENT
normocephalic
Head and Scalp: atraumatic
Eyes
General Eye: normal appearance of both eyes
Resp
normal respiratory effort and clear to auscultation bilaterally
Effort and Inspection: able to speak in complete sentences
Cardio
regular rate and regular rhythm
Back/Spine
normal ROM
Extremity
normal capillary refill
Extremity Narrative: 
Right shoulder still does not have full range of motion.
Peripheral Pulses: Yes pulses 2+ throughout
Skin
Skin Narrative: 
Right axilla rash has resolved with the use of nystatin powder.
Neuro
CN's II-XII intact bilaterally
Psych
cooperative
Psych Narrative: 
She is anxious and weepy.
Appearance: well kempt

Debridement Note
Debridement Note
Post-Debridement Measurements and Additional Note: 
Post-Debridement Measurements/Treatment

 - Nurse 1 - General Ulcer Assessment               Start:  24 09:38
Freq:                                                 Status: Active        
Protocol:  ODIN                                                        
Activity Type Activity Date Activity User E-sign Co-sign Detail
Recorded Client Recorded Date Recorded By   
Document 24 09:38 KW   
][ 24 09:50 KW   
Document 24 13:42 RB   
[id number] 24 13:43 RB   

  24
  09:38 13:42
 - Today's Visit Information  
Type of service Initial Visit Follow-up Visit
  (Physician/CNP
  )
Arrival Mode Ambulatory Ambulatory
Transfer Assistance  None
Patient Identification Verified (Name & Yes Yes
)  
Patient Requires Transmission-Based  No
Precautions  
Height and Weight  
Height 5 ft 4 in 
Weight 136 lb 
Weight in Pounds 136.0 lbs 
Weight Measurement Method Estimated by 
 Patient 
Body Mass Index (BMI) 23.3 23.3
BMI Classification Normal Normal
BSA - Helen 1.66 
Vital Signs  
Temperature (97.8 F-99.1 F) 98.0 F 97.1 F L
Temperature Source Temporal Temporal
Pulse Rate () 92 71
Pulse Location Monitor Monitor
Respiratory Rate (12-18) 18 18
Respiratory rate source Observation Observation
Oxygen Delivery Method Room Air 
Blood Pressure (90//80) 144/85 H 131/87 H
Blood Pressure Mean (mm Hg) 104 101
Source Monitor Monitor
Position Semi-Fowlers Semi-Fowlers
Blood Pressure Location Left Arm Left Arm
History Since Last Visit- (Skip if this  
is Patient's initial visit)  
Have you changed medications since your  No
last visit?  
Any new allergies or adverse reactions  No
Had a fall/change in ADL's that may  No
increase risk of falls  
Signs or symptoms of abuse and/or  No
neglect since last visit  
Have you been in the hospital since your  No
last visit?  
Has dressing in place as prescribed  No
Has compression in place as prescribed  No
Has offloadiing in place as prescribed  No
Experienced any changes in pain level or  No
management  
Left Footwear Regular Shoe 
Right Footwear Regular Shoe 
Pain Scale: 0-10 Numeric  
Is Patient Pain Free? Yes Yes
Communication Assessment  
Preferred language English 
 Required No 
Able to Read Yes 
Able to Write Yes 
Right Hearing Abillity Normal 
Left Hearing Abillity Normal 
Visual Assistive Devices None 
Teaching Assessment  
Preferences Verbal,Written, 
 Demonstration 
Barriers to Learning None 
Readiness To Learn Excellent 
Willingness to Engage in Self Management High 
Activies  
Readiness to Engage in Self Management High 
Activities  
Anxiety Level Anxious 
Cooperation Cooperative 
Perception Coherent 
Interest in Health Problem Asks Questions 
Education Importance Acknowledges 
 Need 
Does Patient Smoke tobacco or other No 
substances  
Is Patient Diabetic No 
Functional Assessment  
Recent Decline in Ability to Perform Denies Any 
 Declines 
Culture/Islam/  
Cultural/Islam Needs that may affect No 
Treatment Plan  
Would you allow our hospital  to No 
meet you for the purpose of spiritual/  
emotional support?  
 to contact place of Judaism No 



24 13:43 Wound Center by Yani Rider
pt Right axilla no wound noted. skin intact. no drainage. 


Initialized on 24 13:43 - END OF NOTE


WC - Nurse 1 - General Ulcer Measurement              Start:  24 09:38
Freq:                                                 Status: Active        
Protocol:                                                                   
Activity Type Activity Date Activity User E-sign Co-sign Detail
Recorded Client Recorded Date Recorded By   
Document 24 09:38 KW   
][ 24 09:50 KW   

  24
  09:38
Wound Center Nurse 1 
#2 RT AXILLA 
 -Current Size (cm) - Length 0.1
 -Current Size (cm) - Width 0.1
 -Current Size (cm) - Depth 0.1
 -Total Square Cm 0.01
 -Date of Last Picture (Recall this 24
field) 
 -Exudate Amt Small
 -Exudate Type Serosanguineous
 -Wound Margin Indistinct, Non
 -Visible
 -Granulation Amt Large (%)
 -Granulation Quality Red
 -Texture (Lilian-wound Skin Appearance) Assessed
 -Moisture (Lilian-wound Skin Appearance) Assessed
 -Color (Lilian-wound Skin Appearance) Assessed
 -Temperature (Lilian-wound Skin No Abnormality
Appearance) (Pt Warm)
 -Tenderness on Palpation (Lilian-wound No
Skin Appearance) 
 -Ulcer Cleansing Rinsed/
 Irrigated with
 Saline
 -Foul Odor after Cleansing No

WC - Nurse 2 - General Ulcer CM Notes                 Start:  24 09:38
Freq:                                                 Status: Active        
Protocol:                                                                   
Activity Type Activity Date Activity User E-sign Co-sign Detail
Recorded Client Recorded Date Recorded By   
Document 24 10:13    
[id number] 24 10:14    
Document 24 13:50    
[id number] 24 13:51    

  24
  10:13 13:50
Wound Center Nurse 2  
 -Correct Patient No 
 -Correct Side, Site, Position No 
 -Correct Procedure No 
Pain Scale: 0-10 Numeric  
Is Patient Pain Free? Yes Yes

 - Nurse 3 - General Ulcer D/C NN                   Start:  24 09:38
Freq:                                                 Status: Active        
Protocol:                                                                   
Activity Type Activity Date Activity User E-sign Co-sign Detail
Recorded Client Recorded Date Recorded By   
Document 24 13:52    
[id number] 24 13:52    

  24
  13:52
Is Patient Pain Free? Yes
WC - Visit Discharge 
Discharge Condition Stable
Ambulatory Status Ambulatory
Transportation Private Auto
Clinical Summary of Care Provided Yes
Notes: no wounds, has
 rx for nystatin
 powder




Assessment/Plan
Assessment/Plan
(1) Candidal skin infection: 
CODE(S):       B37.2 - Candidiasis of skin and nail
(2) Stiffness of right shoulder joint: 
CODE(S):       M25.611 - Stiffness of right shoulder, not elsewhere classified
(3) Anxiety: 
CODE(S):       F41.9 - Anxiety disorder, unspecified
PLAN: 
Plan
Patient evaluated at the wound healing center today.
She does not have an open wound.  Her right axilla yeast rash has resolved with the use of nystatin powder.
She states that the rash resolved over the past week.  Instructed her that she can continue the nystatin powder for 7 days after the rash completely resolved.
She has concerns that she is still perspiring from her right axilla where she had her hidradenitis excised.  She thought that she should not sweat in this area any longer.  Educated her that she still has sweat glands and it is normal to perspire 
where there are sweat glands.
Follow up as needed.

## 2024-09-16 ENCOUNTER — APPOINTMENT (OUTPATIENT)
Dept: DERMATOLOGY | Facility: CLINIC | Age: 54
End: 2024-09-16
Payer: COMMERCIAL

## 2024-09-16 DIAGNOSIS — L20.89 OTHER ATOPIC DERMATITIS: Primary | ICD-10-CM

## 2024-09-16 PROCEDURE — 99213 OFFICE O/P EST LOW 20 MIN: CPT | Performed by: NURSE PRACTITIONER

## 2024-09-16 RX ORDER — TRIAMCINOLONE ACETONIDE 1 MG/G
CREAM TOPICAL
Qty: 453.6 G | Refills: 0 | Status: SHIPPED | OUTPATIENT
Start: 2024-09-16

## 2024-09-16 NOTE — PROGRESS NOTES
Subjective     Janessa Pleitez is a 54 y.o. female who presents for the following: Eczema.   Established patient in for 3-month follow-up patient was last seen July 15, 2024 and prescribed to continue triamcinolone 0.1% cream twice daily alternating with tacrolimus 0.1% ointment twice daily as needed.    Review of Systems:  No other skin or systemic complaints other than what is documented elsewhere in the note.    The following portions of the chart were reviewed this encounter and updated as appropriate:       Skin Cancer History  No skin cancer on file.    Specialty Problems    None    Past Medical History:  Janessa Pleitez  has no past medical history on file.    Past Surgical History:  Janessa Pleitez  has no past surgical history on file.    Family History:  Patient family history is not on file.    Social History:  Janessa Pleitez  has no history on file for tobacco use, alcohol use, and drug use.    Allergies:  Azithromycin, Meclizine, Amoxicillin-pot clavulanate, Clindamycin, and Midazolam    Current Medications / CAM's:    Current Outpatient Medications:     citalopram (CeleXA) 10 mg tablet, Take 1 tablet (10 mg) by mouth once daily., Disp: , Rfl:     clobetasol (Temovate) 0.05 % ointment, Apply topically twice a day., Disp: , Rfl:     diclofenac sodium (Voltaren) 1 % gel, Apply topically 4 times a day., Disp: , Rfl:     fluticasone (Flonase) 50 mcg/actuation nasal spray, USE 1 (ONE) Spray in each nostril daily at bedtime., Disp: , Rfl:     olopatadine (Pataday) 0.2 % ophthalmic solution, Administer 1 drop into affected eye(s) once daily., Disp: , Rfl:     pseudoephedrine (Sudafed) 30 mg tablet, Take 1-2 tablets (30-60 mg) by mouth every 6 hours if needed., Disp: , Rfl:     tacrolimus (Protopic) 0.1 % ointment, 1 Application, Disp: , Rfl:     tacrolimus (Protopic) 0.1 % ointment, Apply topically 2 times a day., Disp: 60 g, Rfl: 1    triamcinolone (Kenalog) 0.1 % cream, Apply TWICE DAILY TO THE AFFECTED  AREA(S) for up to 2 (TWO) weeks at a time or until clear if needed for itch, Disp: , Rfl:     triamcinolone (Kenalog) 0.1 % cream, Apply topically 2 times a day as needed for rash., Disp: 80 g, Rfl: 5    triamcinolone (Kenalog) 0.1 % cream, Apply to affected areas twice daily. Use less than 14 days per month., Disp: 453.6 g, Rfl: 0    valACYclovir (Valtrex) 1 gram tablet, Take 1 tablet (1,000 mg) by mouth twice a day., Disp: , Rfl:      Objective   Well appearing patient in no apparent distress; mood and affect are within normal limits.      Assessment/Plan   1. Other atopic dermatitis  Erythematous, eczematous patch(es)/plaque(s) with xerotic scale.  Improved since last visit with triamcinolone 0.1% cream.  She has tried dupixent (self d/c'd due to fear of injection) in 2020.    -Discussed the nature of condition  -Recommend to utilize gentle skin care habits with gentle cleansers, limiting water exposure, and using liberal emollients. Recommend to use liberal emollients twice daily, one time applied immediately after shower while skin is still slightly damp. Use emollients to all areas of the body that may be affected and use whether the rash is active or not. Use prescription medications before applying emollients.   -Discussed with/information to the patient on the risks, benefits and alternatives of the usage of topical corticosteroids, including but not limited to: atrophy (thinning of the skin), striae (stretch marks), telangiectasia (blood vessel growth), and dyspigmentation (discoloration of the skin).  -Recommend to limit long-term use of topical corticosteroids to less than 14 days per month to reduce risk of side effects.  -Recommend:    triamcinolone (Kenalog) 0.1 % cream  Apply to affected areas twice daily. Use less than 14 days per month.

## 2024-11-18 ENCOUNTER — APPOINTMENT (OUTPATIENT)
Dept: DERMATOLOGY | Facility: CLINIC | Age: 54
End: 2024-11-18
Payer: COMMERCIAL

## 2025-04-03 ENCOUNTER — APPOINTMENT (OUTPATIENT)
Dept: DERMATOLOGY | Facility: CLINIC | Age: 55
End: 2025-04-03
Payer: COMMERCIAL

## 2025-04-03 DIAGNOSIS — L20.89 OTHER ATOPIC DERMATITIS: Primary | ICD-10-CM

## 2025-04-03 PROCEDURE — 99213 OFFICE O/P EST LOW 20 MIN: CPT | Performed by: NURSE PRACTITIONER

## 2025-04-03 NOTE — PROGRESS NOTES
Subjective     Janessa Pleitez is a 55 y.o. female who presents for the following: Eczema.   Established patient in for follow up, last seen 09/2024 and instructed to use triamcinolone 0.1% cream.    Has tried: tacrolimus 0.1% cream, dupixent (d/c'd due to fear of injection)    Patient states that she was seen by her primary care recently and prescribed clotrimazole betamethasone cream to the flare on her elbow back and hands patient states it has been somewhat helpful.  Patient states that she also was seen for a red rash in her left axilla and was prescribed nystatin powder she would like the area examined today.      Review of Systems:  No other skin or systemic complaints other than what is documented elsewhere in the note.    The following portions of the chart were reviewed this encounter and updated as appropriate:       Skin Cancer History  No skin cancer on file.    Specialty Problems    None    Past Medical History:  Janessa Pleitez  has no past medical history on file.    Past Surgical History:  Janessa Pleitez  has no past surgical history on file.    Family History:  Patient family history is not on file.    Social History:  Janessa Pleitez  has no history on file for tobacco use, alcohol use, and drug use.    Allergies:  Azithromycin, Meclizine, Amoxicillin-pot clavulanate, Clindamycin, and Midazolam    Current Medications / CAM's:    Current Outpatient Medications:     citalopram (CeleXA) 10 mg tablet, Take 1 tablet (10 mg) by mouth once daily., Disp: , Rfl:     clobetasol (Temovate) 0.05 % ointment, Apply topically twice a day., Disp: , Rfl:     diclofenac sodium (Voltaren) 1 % gel, Apply topically 4 times a day., Disp: , Rfl:     fluticasone (Flonase) 50 mcg/actuation nasal spray, USE 1 (ONE) Spray in each nostril daily at bedtime., Disp: , Rfl:     olopatadine (Pataday) 0.2 % ophthalmic solution, Administer 1 drop into affected eye(s) once daily., Disp: , Rfl:     pseudoephedrine (Sudafed) 30  mg tablet, Take 1-2 tablets (30-60 mg) by mouth every 6 hours if needed., Disp: , Rfl:     tacrolimus (Protopic) 0.1 % ointment, 1 Application, Disp: , Rfl:     tacrolimus (Protopic) 0.1 % ointment, Apply topically 2 times a day., Disp: 60 g, Rfl: 1    triamcinolone (Kenalog) 0.1 % cream, Apply TWICE DAILY TO THE AFFECTED AREA(S) for up to 2 (TWO) weeks at a time or until clear if needed for itch, Disp: , Rfl:     triamcinolone (Kenalog) 0.1 % cream, Apply topically 2 times a day as needed for rash., Disp: 80 g, Rfl: 5    triamcinolone (Kenalog) 0.1 % cream, Apply to affected areas twice daily. Use less than 14 days per month., Disp: 453.6 g, Rfl: 0    valACYclovir (Valtrex) 1 gram tablet, Take 1 tablet (1,000 mg) by mouth twice a day., Disp: , Rfl:      Objective   Well appearing patient in no apparent distress; mood and affect are within normal limits.      Assessment/Plan   1. Other atopic dermatitis  Generalized, Left Breast, Left Forearm - Anterior, Left Upper Arm - Posterior, Neck - Posterior  Erythematous, eczematous patches with xerotic scale.  Improved since last visit with triamcinolone 0.1% cream.  She has tried dupixent (self d/c'd due to fear of injection) in 2020.  She is currently using betamethasone/clotramazole prescribed by PCP.  She has tried and failed multiple rounds of oral steroids, topical steroids, and elidel in the past.  She did well on dupixent, but is unable to inject medication due to severe anxiety.  BSA 10%     -Discussed the nature of condition  -Recommend to utilize gentle skin care habits with gentle cleansers, limiting water exposure, and using liberal emollients. Recommend to use liberal emollients twice daily, one time applied immediately after shower while skin is still slightly damp. Use emollients to all areas of the body that may be affected and use whether the rash is active or not. Use prescription medications before applying emollients.   -Discussed with/information to  the patient on the risks, benefits and alternatives of the usage of topical corticosteroids, including but not limited to: atrophy (thinning of the skin), striae (stretch marks), telangiectasia (blood vessel growth), and dyspigmentation (discoloration of the skin).  -Recommend to limit long-term use of topical corticosteroids to less than 14 days per month to reduce risk of side effects.  -Discontinue betamethasone/clotrimazole and restart triamcinolone 0.1% cream   - Given recalcitrance we discussed starting treatment with Rinvoq.   -Recommend:    Related Procedures  Comprehensive Metabolic Panel  Lipid Panel  HIV 1/2 Antigen/Antibody Screen with Reflex to Confirmation  CBC and Auto Differential  Hepatitis C Antibody  Hepatitis B Surface Antigen  Hepatitis B Surface Antibody  Hepatitis B Core Antibody, Total  T-Spot TB    Related Medications  triamcinolone (Kenalog) 0.1 % cream  Apply to affected areas twice daily. Use less than 14 days per month.

## 2025-07-14 ENCOUNTER — APPOINTMENT (OUTPATIENT)
Dept: DERMATOLOGY | Facility: CLINIC | Age: 55
End: 2025-07-14
Payer: COMMERCIAL

## 2025-10-09 ENCOUNTER — APPOINTMENT (OUTPATIENT)
Dept: DERMATOLOGY | Facility: CLINIC | Age: 55
End: 2025-10-09
Payer: COMMERCIAL